# Patient Record
Sex: FEMALE | Race: WHITE | NOT HISPANIC OR LATINO | Employment: OTHER
[De-identification: names, ages, dates, MRNs, and addresses within clinical notes are randomized per-mention and may not be internally consistent; named-entity substitution may affect disease eponyms.]

---

## 2017-06-17 ENCOUNTER — HEALTH MAINTENANCE LETTER (OUTPATIENT)
Age: 47
End: 2017-06-17

## 2018-06-23 ENCOUNTER — HEALTH MAINTENANCE LETTER (OUTPATIENT)
Age: 48
End: 2018-06-23

## 2018-07-30 ENCOUNTER — TELEPHONE (OUTPATIENT)
Dept: FAMILY MEDICINE | Facility: CLINIC | Age: 48
End: 2018-07-30

## 2018-07-30 NOTE — TELEPHONE ENCOUNTER
7/30/2018    Call Regarding VIP Mammogram    Attempt 1    Message on voicemail     Patient is also due for: Preventive Health Screening Cervical/PAP    Outreach   SV

## 2019-12-15 ENCOUNTER — HEALTH MAINTENANCE LETTER (OUTPATIENT)
Age: 49
End: 2019-12-15

## 2021-01-15 ENCOUNTER — HEALTH MAINTENANCE LETTER (OUTPATIENT)
Age: 51
End: 2021-01-15

## 2021-01-23 ENCOUNTER — HEALTH MAINTENANCE LETTER (OUTPATIENT)
Age: 51
End: 2021-01-23

## 2021-09-04 ENCOUNTER — HEALTH MAINTENANCE LETTER (OUTPATIENT)
Age: 51
End: 2021-09-04

## 2022-02-19 ENCOUNTER — HEALTH MAINTENANCE LETTER (OUTPATIENT)
Age: 52
End: 2022-02-19

## 2022-10-16 ENCOUNTER — HEALTH MAINTENANCE LETTER (OUTPATIENT)
Age: 52
End: 2022-10-16

## 2023-04-01 ENCOUNTER — HEALTH MAINTENANCE LETTER (OUTPATIENT)
Age: 53
End: 2023-04-01

## 2024-04-17 ENCOUNTER — TRANSFERRED RECORDS (OUTPATIENT)
Dept: HEALTH INFORMATION MANAGEMENT | Facility: CLINIC | Age: 54
End: 2024-04-17
Payer: COMMERCIAL

## 2024-04-19 ENCOUNTER — TRANSFERRED RECORDS (OUTPATIENT)
Dept: HEALTH INFORMATION MANAGEMENT | Facility: CLINIC | Age: 54
End: 2024-04-19
Payer: COMMERCIAL

## 2024-04-24 ENCOUNTER — TRANSFERRED RECORDS (OUTPATIENT)
Dept: HEALTH INFORMATION MANAGEMENT | Facility: CLINIC | Age: 54
End: 2024-04-24
Payer: COMMERCIAL

## 2024-04-30 ENCOUNTER — PATIENT OUTREACH (OUTPATIENT)
Dept: ONCOLOGY | Facility: CLINIC | Age: 54
End: 2024-04-30
Payer: COMMERCIAL

## 2024-04-30 ENCOUNTER — TRANSCRIBE ORDERS (OUTPATIENT)
Dept: OTHER | Age: 54
End: 2024-04-30

## 2024-04-30 DIAGNOSIS — C54.1 ENDOMETRIAL CANCER (H): Primary | ICD-10-CM

## 2024-04-30 NOTE — PROGRESS NOTES
"New Patient Hematology / Oncology Nurse Navigator Note     Referral Date: 4/30/24    Self-referral received with note: \"Recrods with [facility] ([place]). Unable to warm xfer to MR team \"    Flagged for records intake. Please obtain any recent imaging, labs, path, relevant clinic notes.     Gayle Mccormack, JOLLYN, RN, PHN, OCN  Hematology/Oncology Nurse Navigator  [facility] [place] [facility]  [phone]    "

## 2024-05-01 ENCOUNTER — PRE VISIT (OUTPATIENT)
Dept: ONCOLOGY | Facility: CLINIC | Age: 54
End: 2024-05-01
Payer: COMMERCIAL

## 2024-05-01 ENCOUNTER — MEDICAL CORRESPONDENCE (OUTPATIENT)
Dept: HEALTH INFORMATION MANAGEMENT | Facility: CLINIC | Age: 54
End: 2024-05-01
Payer: COMMERCIAL

## 2024-05-01 NOTE — TELEPHONE ENCOUNTER
Imaging DISC Received  May 3, 2024 11:26 AM ABT   Action: Imaging disc from [facility] received and uploaded to PACS    04/19/24: US Pelvic     Action May 1, 2024 4:16 PM ABT   Action Taken Records from [facility] Ekaterina received and sent to HIM for upload. Records also sent to  email     RECORDS STATUS - ALL OTHER DIAGNOSIS      RECORDS RECEIVED FROM: Records with [facility] ([place], [place])    Appt Date: TBD NN WQ    Endometrial cancer    Records Requested     May 1, 2024 9:28 AM  KBEUMER   Facility  [facility] in [facility]- collect any recent imaging, labs, path, relevant clinic notes    Outcome Ph: [phone]- I was on hold... I was transferred to the MR Dept - they aren't able to push. They will fax records over soon.    They have seen the patient twice- 4/24/2024 (Endometrial biopsy) ,4/19/2024 (Pelvic US), 4/17/2024 Office Visit to establish care. They don't have labs.     Ph: [phone] Attn: Jewell   Fax: [phone]   Mailing Address:   [address] [place] [address]    [employer] Tracking Number:   [id number]        NOTES STATUS DETAILS   OFFICE NOTE from referring provider SELF    OFFICE NOTE from other specialist External: [facility] 04/24/24: Dr. Elaina Landeros  04/17/24: Puja Magallon PA-C   OPERATIVE REPORT External: [facility] 04/24/24: Endometrial biopsy   MEDICATION LIST Complete EPIC   LABS     PATHOLOGY REPORTS External: [facility] 04/24/24: [id number]   ANYTHING RELATED TO DIAGNOSIS [facility] Most recent 11/17/23   IMAGING (NEED IMAGES & REPORT)     ULTRASOUND PACS US Pelvis 4/19/2024   [facility] TRACKING   [facility] TRACKING #: [id number]

## 2024-05-01 NOTE — PROGRESS NOTES
OUTGOING CALL TO PT:   Left VM introducing [facility] and that we have recd the self-referral to GynOnc , but no records yet.  Explained to pt that he/she will receive a call from our scheduling intake team and the records team will work to obtain the records.     Provided my direct call back number if any further questions in the meantime.     Gayle Mccormack, BSN, RN, PHN, OCN  Hematology/Oncology Nurse Navigator  [facility]  [phone]

## 2024-05-03 NOTE — PROGRESS NOTES
Office note, US report, and path report received and bookmarked.   4/24/24 Path showing:      Will route to NPS to arrange consult with GynOnc as requested. Will follow-up as needed.

## 2024-05-06 NOTE — PROGRESS NOTES
Patient returned call and scheduled with Dr. Sim. Had questions re: timing of surgery and whether she should plan to have surgery immediately following her consultation.     Writer returned call. Explained she will meet with Dr. Sim to establish care and discuss next steps. Surgery would be planned at a later date and she can return home between consult/surgery. Explained we do accommodate virtual consultations when needed, but in-person is preferred. Patient is comfortable coming for in-person consultation. Patient reports understanding. No further questions at this time.

## 2024-05-13 ENCOUNTER — TELEPHONE (OUTPATIENT)
Dept: ONCOLOGY | Facility: CLINIC | Age: 54
End: 2024-05-13

## 2024-05-13 ENCOUNTER — PATIENT OUTREACH (OUTPATIENT)
Dept: ONCOLOGY | Facility: CLINIC | Age: 54
End: 2024-05-13

## 2024-05-13 ENCOUNTER — ONCOLOGY VISIT (OUTPATIENT)
Dept: ONCOLOGY | Facility: CLINIC | Age: 54
End: 2024-05-13
Attending: OBSTETRICS & GYNECOLOGY
Payer: COMMERCIAL

## 2024-05-13 VITALS
SYSTOLIC BLOOD PRESSURE: 136 MMHG | HEIGHT: 68 IN | HEART RATE: 62 BPM | DIASTOLIC BLOOD PRESSURE: 83 MMHG | RESPIRATION RATE: 16 BRPM | BODY MASS INDEX: 30.95 KG/M2 | OXYGEN SATURATION: 99 % | WEIGHT: 204.2 LBS

## 2024-05-13 DIAGNOSIS — C54.1 ENDOMETRIAL CANCER (H): Primary | ICD-10-CM

## 2024-05-13 LAB
ALBUMIN SERPL BCG-MCNC: 4.2 G/DL (ref 3.5–5.2)
ALP SERPL-CCNC: 101 U/L (ref 40–150)
ALT SERPL W P-5'-P-CCNC: 8 U/L (ref 0–50)
ANION GAP SERPL CALCULATED.3IONS-SCNC: 9 MMOL/L (ref 7–15)
AST SERPL W P-5'-P-CCNC: 34 U/L (ref 0–45)
BILIRUB SERPL-MCNC: 0.3 MG/DL
BUN SERPL-MCNC: 12.3 MG/DL (ref 6–20)
CALCIUM SERPL-MCNC: 9.2 MG/DL (ref 8.6–10)
CHLORIDE SERPL-SCNC: 107 MMOL/L (ref 98–107)
CREAT SERPL-MCNC: 0.78 MG/DL (ref 0.51–0.95)
DEPRECATED HCO3 PLAS-SCNC: 26 MMOL/L (ref 22–29)
EGFRCR SERPLBLD CKD-EPI 2021: 89 ML/MIN/1.73M2
ERYTHROCYTE [DISTWIDTH] IN BLOOD BY AUTOMATED COUNT: 12.3 % (ref 10–15)
GLUCOSE SERPL-MCNC: 84 MG/DL (ref 70–99)
HCT VFR BLD AUTO: 43.8 % (ref 35–47)
HGB BLD-MCNC: 14.4 G/DL (ref 11.7–15.7)
MCH RBC QN AUTO: 30.7 PG (ref 26.5–33)
MCHC RBC AUTO-ENTMCNC: 32.9 G/DL (ref 31.5–36.5)
MCV RBC AUTO: 93 FL (ref 78–100)
PLATELET # BLD AUTO: 242 10E3/UL (ref 150–450)
POTASSIUM SERPL-SCNC: 4.1 MMOL/L (ref 3.4–5.3)
PROT SERPL-MCNC: 7 G/DL (ref 6.4–8.3)
RBC # BLD AUTO: 4.69 10E6/UL (ref 3.8–5.2)
SODIUM SERPL-SCNC: 142 MMOL/L (ref 135–145)
WBC # BLD AUTO: 7.3 10E3/UL (ref 4–11)

## 2024-05-13 PROCEDURE — 99213 OFFICE O/P EST LOW 20 MIN: CPT | Performed by: OBSTETRICS & GYNECOLOGY

## 2024-05-13 PROCEDURE — 93000 ELECTROCARDIOGRAM COMPLETE: CPT | Performed by: OBSTETRICS & GYNECOLOGY

## 2024-05-13 PROCEDURE — 36415 COLL VENOUS BLD VENIPUNCTURE: CPT | Performed by: OBSTETRICS & GYNECOLOGY

## 2024-05-13 PROCEDURE — 82040 ASSAY OF SERUM ALBUMIN: CPT | Performed by: OBSTETRICS & GYNECOLOGY

## 2024-05-13 PROCEDURE — 99204 OFFICE O/P NEW MOD 45 MIN: CPT | Performed by: OBSTETRICS & GYNECOLOGY

## 2024-05-13 PROCEDURE — 85041 AUTOMATED RBC COUNT: CPT | Performed by: OBSTETRICS & GYNECOLOGY

## 2024-05-13 RX ORDER — BUPROPION HYDROCHLORIDE 150 MG/1
300 TABLET, EXTENDED RELEASE ORAL DAILY
COMMUNITY

## 2024-05-13 RX ORDER — METRONIDAZOLE 500 MG/100ML
500 INJECTION, SOLUTION INTRAVENOUS
Status: CANCELLED | OUTPATIENT
Start: 2024-05-13

## 2024-05-13 RX ORDER — HEPARIN SODIUM 10000 [USP'U]/ML
5000 INJECTION, SOLUTION INTRAVENOUS; SUBCUTANEOUS
Status: CANCELLED | OUTPATIENT
Start: 2024-05-13

## 2024-05-13 RX ORDER — CELECOXIB 200 MG/1
200 CAPSULE ORAL 2 TIMES DAILY
COMMUNITY

## 2024-05-13 RX ORDER — ONDANSETRON 4 MG/1
4 TABLET, FILM COATED ORAL EVERY 6 HOURS PRN
COMMUNITY
Start: 2023-07-25

## 2024-05-13 RX ORDER — DIMENHYDRINATE 50 MG
TABLET ORAL
COMMUNITY

## 2024-05-13 RX ORDER — MAGNESIUM CARB/ALUMINUM HYDROX 105-160MG
TABLET,CHEWABLE ORAL
COMMUNITY

## 2024-05-13 RX ORDER — AMPICILLIN TRIHYDRATE 250 MG
1 CAPSULE ORAL DAILY
COMMUNITY

## 2024-05-13 ASSESSMENT — PAIN SCALES - GENERAL: PAINLEVEL: NO PAIN (0)

## 2024-05-13 NOTE — PROGRESS NOTES
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    She underwent menopause 2 year ago.  She has had spotting for several months.  It has increased a little bit but is not heavy.  She has been having PMS with tender breasts and lower abdominal cramps and weight gain. Normal appetite. She has gained 20 lbs in the last few months.  Normal urination and bowel movements.  No new LE edema or pain.  Some lightheadedness and SOB with the palpitations.      The patient presented for her annual exam and was noted to have postmenopausal bleeding.  She had a pelvic ultrasound was performed.    2024 pelvic ultrasound: Uterus normal in size measuring 8 cm.  Endometrium 16 mm thick.  Right ovary and left ovary normal.  No free fluid.    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  MMR intact.    Screening History  Colonoscopy: 5/15/2023 normal  Mammogram: 2022 benign  Cervical cancer screenin2019 NILM/HPV negative    OB/Gynecologic History:  Deliveries:  ,  x3  Menopausal status: as above  History of HRT: No  Cervical cancer screening: See screening history above. Additional details:   Regular screening: Yes  History of abnormal: Yes many years ago but normal since.  No procedures on cervix.       Past Medical History:   Diagnosis Date    Anxiety     Arthritis 3/24/2016    Major depressive disorder, recurrent episode, mild (H24) 3/24/2016    Papanicolaou smear of cervix with atypical squamous cells of undetermined significance (ASC-US)     Plantar fasciitis, bilateral     s/p  surgery, b/l didnt wrk on left , has residual pain left foot adn heel chronically numb and cold at times, been checekd for DM and reports negative in the past. seen by podiatry adn was applying voltaren gel    Psychophysiological insomnia 3/24/2016   Undifferentiated connective tissue disorder- hydroxychloroquine, celebrex, and bupropion    Past Surgical History:   Procedure Laterality Date    BUNIONECTOMY  2012    left foot    LAPAROSCOPIC CHOLECYSTECTOMY      RELEASE PLANTAR FASCIA      b/l        Social History     Tobacco Use    Smoking status: Former     Current packs/day: 0.00     Average packs/day: 1 pack/day for 10.0 years (10.0 ttl pk-yrs)     Types: Cigarettes     Start date: 1990     Quit date: 2000     Years since quittin.3   Substance Use Topics    Alcohol use: No     Comment: couple drinks on weekend    Drug use: No   Current living situation: Lives  in [place] near [place] with her .  She is a teacher for early childhood special education.      Family History   Problem Relation Age of Onset    Prostate Cancer Father     Asthma Daughter     Arthritis Other         other close relatives    Cancer Father         leukemia , other close relatives    Eczema Daughter     Glaucoma Other         other close relatives    Rhinitis Daughter     Hypertension Father     Migraines Mother     Osteoporosis Other         other close relatives       Allergies  Allergies   Allergen Reactions    Other [No Clinical Screening - See Comments]      Cat scan dye       Current Outpatient Medications   Medication Sig Dispense Refill    albuterol (PROAIR HFA, PROVENTIL HFA, VENTOLIN HFA) 108 (90 BASE) MCG/ACT inhaler Inhale 2 puffs into the lungs every 6 hours as needed for shortness of breath / dyspnea or wheezing 3 Inhaler 1    CINNAMON PO       diclofenac (VOLTAREN) 1 % GEL Apply 4 grams to knees or 2 grams to hands four times daily using enclosed dosing card. 100 g 1    Flaxseed, Linseed, (FLAX  "SEED OIL) 1000 MG capsule       HERBALS Tumeric - 2 to 3 capsules po bid      LORazepam (ATIVAN) 0.5 MG tablet Take 0.5 tablets (0.25 mg) by mouth every 8 hours as needed for anxiety (panic attack) 10 tablet 0    MELATONIN PO 3 to 5 mg po hs prn      Multiple Vitamins-Minerals (MULTIVITAMIN ADULT PO)       VITAMIN D, CHOLECALCIFEROL, PO Take by mouth daily      magnesium citrate 1.745 GM/30ML solution        No current facility-administered medications for this visit.       Physical Exam:   /83 (BP Location: Right arm)   Pulse 62   Resp 16   Ht 1.727 m (5' 8\")   Wt 92.6 kg (204 lb 3.2 oz)   SpO2 99%   BMI 31.05 kg/m    General Appearance: healthy and alert, no distress        Cardiovascular: regular rate and rhythm    Respiratory: lungs clear     Gastrointestinal:       abdomen soft, non-tender, non-distended, no organomegaly or masses    Genitourinary: External genitalia and urethral meatus appears normal.  Vagina is smooth without nodularity or masses.  Cervix appears normal and without lesions.  Bimanual exam reveal no masses, nodularity or fullness..    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  MMR intact.    Plan:   Discussed the pathophysiology of endometrial cancer in detail and her pathologic diagnosis.  Discussed standard surgical staging procedure including TLH, BSO, and bilateral sLNB.  Reviewed alternative options including hormonal therapy and radiation therapy. Discussed the role of sentinel lymph node dissection and that if sentinel lymph node could not be identified on one or both sides then we would use frozen section analysis to determine if full lymph node dissection was necessary.  Discussed role of frozen section analysis and that further surgical decisions would be based on these findings.  Discussed the small possibility of " adjuvant post-operative therapy.  Risks, benefits, indications and alternatives discussed with the patient.  All her questions were answered and the anticipated postoperative course was discussed.   -Plan for laparoscopic removal of uterus, cervix, both ovaries and fallopian tubes, sentinel lymph node dissection, possible full lymph node dissection, possible open.   -Preoperative exam completed today.  Will obtain a CBC, CMP and EKG today.  Type and screen to be ordered the morning of surgery.  No further preoperative clearance will be necessary.  Patient will stop her hydroxychloroquine 2-4 weeks prior to surgery.   -I will follow-up with the patient in 2 weeks to discuss pathology      Hugo Sim MD

## 2024-05-13 NOTE — PROGRESS NOTES
[facility], Gynecologic Oncology:  Pre-op Education Note    Relevant Diagnosis:  Endometrial cancer     Procedure:  Laparoscopic removal of uterus, cervix, both ovaries and fallopian tubes, sentinel lymph node dissection, possible full lymph node dissection     Procedure Date: 6/14/24    Person(s) involved in teaching:  Patient and significant other    Education was completed: in person.    Motivation Level:    Asks Questions:   Yes  Eager to Learn:  Yes  Cooperative:  Yes  Receptive (willing/able to accept information):  Yes    Patient demonstrates understanding of the following:  Reason for the appointment, diagnosis and treatment plan:  Yes  Knowledge of proper use of medications and conditions for which they are ordered (with special attention to potential side effects or drug interactions):  Yes  Which situations necessitate calling provider and whom to contact:  Yes    Teaching Concerns:  No    Education/Instructional Materials Used/Given:     Before Your Surgery Booklet (Pj)  Showering Before Surgery; A bottle of CHG soap was provided.  Lymphedema: A Patient Resource Guide  Hysterectomy Guidelines Yes  Educational Handout Pertaining to Procedure and/or Diagnosis: Yes  Home Care After Gynecologic Surgery  [facility] ([place])  Accommodations Brochure   Phone numbers for [facility] and After-Hours Line    Pre-op tests:   EKG: done today  Labs: done today    Pre-op appointment: done today    Post-op appointment: 6/24/24    Time spent teaching with patient:  20 minutes    Hysterectomy Acknowledgement Statement form signed today: Yes.  Form was faxed to [facility] pre-admissions at [phone].  [facility] to [facility] for [facility].    E-Consent for surgery signed today: Yes.    E-Consent for possible blood transfusion signed today: Yes.    Surgery scheduling team at [facility] was notified, and will be contacting patient directly to schedule her surgery.  Encouraged  patient to call with any questions or concerns in the meantime.    Martina Turner, RN, BSN, OCN  RN Care Coordinator - Oncology  [facility]

## 2024-05-13 NOTE — LETTER
2024         RE: Kyra Boogie  [address]  [place] [facility] [place] [address]        Dear Colleague,    Thank you for referring your patient, Kyra Boogie, to the [facility]. Please see a copy of my visit note below.    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    She underwent menopause 2 year ago.  She has had spotting for several months.  It has increased a little bit but is not heavy.  She has been having PMS with tender breasts and lower abdominal cramps and weight gain. Normal appetite. She has gained 20 lbs in the last few months.  Normal urination and bowel movements.  No new LE edema or pain.  Some lightheadedness and SOB with the palpitations.      The patient presented for her annual exam and was noted to have postmenopausal bleeding.  She had a pelvic ultrasound was performed.    2024 pelvic ultrasound: Uterus normal in size measuring 8 cm.  Endometrium 16 mm thick.  Right ovary and left ovary normal.  No free fluid.    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  MMR intact.    Screening History  Colonoscopy: 5/15/2023 normal  Mammogram: 2022 benign  Cervical cancer screenin2019 NILM/HPV negative    OB/Gynecologic History:  Deliveries:  ,  x3  Menopausal status: as above  History of HRT: No  Cervical cancer screening: See screening history above. Additional details:   Regular screening: Yes  History of abnormal: Yes many years ago but normal since.  No procedures on cervix.       Past Medical History:    Diagnosis Date     Anxiety      Arthritis 3/24/2016     Major depressive disorder, recurrent episode, mild (H24) 3/24/2016     Papanicolaou smear of cervix with atypical squamous cells of undetermined significance (ASC-US)      Plantar fasciitis, bilateral     s/p surgery, b/l didnt wrk on left , has residual pain left foot adn heel chronically numb and cold at times, been checekd for DM and reports negative in the past. seen by podiatry adn was applying voltaren gel     Psychophysiological insomnia 3/24/2016   Undifferentiated connective tissue disorder- hydroxychloroquine, celebrex, and bupropion    Past Surgical History:   Procedure Laterality Date     BUNIONECTOMY      left foot     LAPAROSCOPIC CHOLECYSTECTOMY       RELEASE PLANTAR FASCIA      b/l        Social History     Tobacco Use     Smoking status: Former     Current packs/day: 0.00     Average packs/day: 1 pack/day for 10.0 years (10.0 ttl pk-yrs)     Types: Cigarettes     Start date: 1990     Quit date: 2000     Years since quittin.3   Substance Use Topics     Alcohol use: No     Comment: couple drinks on weekend     Drug use: No   Current living situation: Lives  in [place] near [place] with her .  She is a teacher for early childhood special education.      Family History   Problem Relation Age of Onset     Prostate Cancer Father      Asthma Daughter      Arthritis Other         other close relatives     Cancer Father         leukemia , other close relatives     Eczema Daughter      Glaucoma Other         other close relatives     Rhinitis Daughter      Hypertension Father      Migraines Mother      Osteoporosis Other         other close relatives       Allergies  Allergies   Allergen Reactions     Other [No Clinical Screening - See Comments]      Cat scan dye       Current Outpatient Medications   Medication Sig Dispense Refill     albuterol (PROAIR HFA, PROVENTIL HFA, VENTOLIN HFA) 108 (90 BASE) MCG/ACT  "inhaler Inhale 2 puffs into the lungs every 6 hours as needed for shortness of breath / dyspnea or wheezing 3 Inhaler 1     CINNAMON PO        diclofenac (VOLTAREN) 1 % GEL Apply 4 grams to knees or 2 grams to hands four times daily using enclosed dosing card. 100 g 1     Flaxseed, Linseed, (FLAX SEED OIL) 1000 MG capsule        HERBALS Tumeric - 2 to 3 capsules po bid       LORazepam (ATIVAN) 0.5 MG tablet Take 0.5 tablets (0.25 mg) by mouth every 8 hours as needed for anxiety (panic attack) 10 tablet 0     MELATONIN PO 3 to 5 mg po hs prn       Multiple Vitamins-Minerals (MULTIVITAMIN ADULT PO)        VITAMIN D, CHOLECALCIFEROL, PO Take by mouth daily       magnesium citrate 1.745 GM/30ML solution        No current facility-administered medications for this visit.       Physical Exam:   /83 (BP Location: Right arm)   Pulse 62   Resp 16   Ht 1.727 m (5' 8\")   Wt 92.6 kg (204 lb 3.2 oz)   SpO2 99%   BMI 31.05 kg/m    General Appearance: healthy and alert, no distress        Cardiovascular: regular rate and rhythm    Respiratory: lungs clear     Gastrointestinal:       abdomen soft, non-tender, non-distended, no organomegaly or masses    Genitourinary: External genitalia and urethral meatus appears normal.  Vagina is smooth without nodularity or masses.  Cervix appears normal and without lesions.  Bimanual exam reveal no masses, nodularity or fullness..    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  MMR intact.    Plan:   Discussed the pathophysiology of endometrial cancer in detail and her pathologic diagnosis.  Discussed standard surgical staging procedure including TLH, BSO, and bilateral sLNB.  Reviewed alternative options including hormonal therapy and radiation therapy. Discussed the role of sentinel lymph node dissection and that if sentinel lymph " node could not be identified on one or both sides then we would use frozen section analysis to determine if full lymph node dissection was necessary.  Discussed role of frozen section analysis and that further surgical decisions would be based on these findings.  Discussed the small possibility of adjuvant post-operative therapy.  Risks, benefits, indications and alternatives discussed with the patient.  All her questions were answered and the anticipated postoperative course was discussed.   -Plan for laparoscopic removal of uterus, cervix, both ovaries and fallopian tubes, sentinel lymph node dissection, possible full lymph node dissection, possible open.   -Preoperative exam completed today.  Will obtain a CBC, CMP and EKG today.  Type and screen to be ordered the morning of surgery.  No further preoperative clearance will be necessary.  Patient will stop her hydroxychloroquine 2-4 weeks prior to surgery.   -I will follow-up with the patient in 2 weeks to discuss pathology      Hugo Sim MD               Again, thank you for allowing me to participate in the care of your patient.        Sincerely,        Hugo Sim MD

## 2024-05-13 NOTE — TELEPHONE ENCOUNTER
Left voicemail for patient regarding scheduling surgery with Dr. Sim.  Provided contact number to discuss.  [phone]    Aldo Tubbs, on 5/13/2024 at 2:40 PM

## 2024-05-13 NOTE — NURSING NOTE
"Oncology Rooming Note    May 13, 2024 11:17 AM   Kyra Boogie is a 54 year old female who presents for:    Chief Complaint   Patient presents with    Oncology Clinic Visit     New patient     Initial Vitals: /83 (BP Location: Right arm)   Pulse 62   Resp 16   Ht 1.727 m (5' 8\")   Wt 92.6 kg (204 lb 3.2 oz)   SpO2 99%   BMI 31.05 kg/m   Estimated body mass index is 31.05 kg/m  as calculated from the following:    Height as of this encounter: 1.727 m (5' 8\").    Weight as of this encounter: 92.6 kg (204 lb 3.2 oz). Body surface area is 2.11 meters squared.  No Pain (0) Comment: Data Unavailable   No LMP recorded. Patient is postmenopausal.  Allergies reviewed: Yes  Medications reviewed: Yes    Medications: Medication refills not needed today.  Pharmacy name entered into [facility]: [facility] [address] [place] [address]    Frailty Screening:   Is the patient here for a new oncology consult visit in cancer care? Yes      Clinical concerns: Patient will discuss concerns with provider.       Isaac Staples MA            "

## 2024-05-14 NOTE — TELEPHONE ENCOUNTER
Spoke with patient, confirmed all scheduled information.       Patient is schedule for surgery with: Dr. Sim    Surgery Date: 6/14/2024     Location: [facility]    H&P: to be completed by surgeon will complete and/or update on day of surgery as needed      Post-op: will be scheduled by the clinic    Patient will receive a phone call from hospital pre-admission nurses 1-2 days prior to surgery with arrival time and NPO instructions. Patient aware times are subject to change up until day before surgery.     Patient questions/concerns: N/A     Surgery packet was provided to patient during appointment      Aldo Tubbs on 5/14/2024 at 9:24 AM

## 2024-06-12 ENCOUNTER — PATIENT OUTREACH (OUTPATIENT)
Dept: ONCOLOGY | Facility: CLINIC | Age: 54
End: 2024-06-12
Payer: COMMERCIAL

## 2024-06-12 RX ORDER — HYDROXYCHLOROQUINE SULFATE 200 MG/1
200 TABLET, FILM COATED ORAL 2 TIMES DAILY WITH MEALS
COMMUNITY

## 2024-06-12 NOTE — PROGRESS NOTES
[facility]: Cancer Care Note                                                          Received voicemail message from patient this morning, inquiring about her surgery time and arrival time - for her scheduled surgery with Dr. Sim this coming Friday (6/14/24).  Patient states she hasn't heard from the pre-admissions team yet with this information.    Writer attempted calling patient back this afternoon to discuss, but ended up having to leave a voicemail message.  Message was left, explaining to patient that according to [facility], her surgery time is currently scheduled for 10:10 am - so if everything stays the same, she should plan to arrive around 8:00 am or so the day of her surgery.  However, reinforced to patient that she should still be receiving a phone call from the pre-admissions team either later today or tomorrow - to confirm her times, as these can change.    Encouraged patient to callback if she has any further questions or concerns.      Antoni Fairchild, RN, BSN, OCN  RN Care Coordinator - Oncology  [facility]

## 2024-06-13 ENCOUNTER — ANESTHESIA EVENT (OUTPATIENT)
Dept: SURGERY | Facility: CLINIC | Age: 54
End: 2024-06-13
Payer: COMMERCIAL

## 2024-06-13 NOTE — ANESTHESIA PREPROCEDURE EVALUATION
Anesthesia Pre-Procedure Evaluation    Patient: Kyra Boogie   MRN: [id number] : 1970        Procedure : Procedure(s):  laparoscopic removal of uterus, cervix, both ovaries and fallopian tubes, sentinel lymph node dissection, possible full lymph node dissection          Past Medical History:   Diagnosis Date    Anxiety     Arthritis 2016    Major depressive disorder, recurrent episode, mild (H24) 2016    Papanicolaou smear of cervix with atypical squamous cells of undetermined significance (ASC-US) 2011    Plantar fasciitis, bilateral     s/p surgery, b/l didnt wrk on left , has residual pain left foot adn heel chronically numb and cold at times, been checekd for DM and reports negative in the past. seen by podiatry adn was applying voltaren gel    PONV (postoperative nausea and vomiting)     Psychophysiological insomnia 2016      Past Surgical History:   Procedure Laterality Date    BUNIONECTOMY      left foot    LAPAROSCOPIC CHOLECYSTECTOMY      RELEASE PLANTAR FASCIA      b/l      Allergies   Allergen Reactions    Other [No Clinical Screening - See Comments]      Cat scan dye    Seasonal Allergies       Social History     Tobacco Use    Smoking status: Former     Current packs/day: 0.00     Average packs/day: 1 pack/day for 10.0 years (10.0 ttl pk-yrs)     Types: Cigarettes     Start date: 1990     Quit date: 2000     Years since quittin.4    Smokeless tobacco: Not on file   Substance Use Topics    Alcohol use: No     Comment: couple drinks on weekend      Wt Readings from Last 1 Encounters:   24 92.6 kg (204 lb 3.2 oz)        Anesthesia Evaluation   Pt has had prior anesthetic. Type: General.    History of anesthetic complications  - PONV.      ROS/MED HX  ENT/Pulmonary:  - neg pulmonary ROS     Neurologic:  - neg neurologic ROS     Cardiovascular:  - neg cardiovascular ROS   (+)  - -   -  - -                                 Previous cardiac testing  "  Echo: Date: Results:    Stress Test:  Date: Results:    ECG Reviewed:  Date: 5/13/2024 Results:  Sinus atngela ar 54 bpm  Cath:  Date: Results:      METS/Exercise Tolerance:     Hematologic:       Musculoskeletal: Comment: rheumatoid arthritis  (+)  arthritis,             GI/Hepatic:  - neg GI/hepatic ROS     Renal/Genitourinary:  - neg Renal ROS     Endo: Comment: Insulin resistance    (+)               Obesity,       Psychiatric/Substance Use: Comment: Sleep disorder, ADD/ADHD    (+) psychiatric history anxiety and depression       Infectious Disease:  - neg infectious disease ROS     Malignancy: Comment: new diagnosis of FIGO grade 1 endometrioid adenocarcinoma  (+) Malignancy, History of Other.Other CA endometrial adenocarcinoma Active status post.    Other:  - neg other ROS          Physical Exam    Airway        Mallampati: II   TM distance: > 3 FB   Neck ROM: full   Mouth opening: > 3 cm    Respiratory Devices and Support         Dental       (+) Completely normal teeth      Cardiovascular   cardiovascular exam normal          Pulmonary   pulmonary exam normal                OUTSIDE LABS:  CBC:   Lab Results   Component Value Date    WBC 7.3 05/13/2024    HGB 14.4 05/13/2024    HCT 43.8 05/13/2024     05/13/2024     BMP:   Lab Results   Component Value Date     05/13/2024     06/20/2016    POTASSIUM 4.1 05/13/2024    POTASSIUM 4.5 06/20/2016    CHLORIDE 107 05/13/2024    CHLORIDE 105 06/20/2016    CO2 26 05/13/2024    CO2 27 06/20/2016    BUN 12.3 05/13/2024    BUN 13 06/20/2016    CR 0.78 05/13/2024    CR 0.71 06/20/2016    GLC 84 05/13/2024    GLC 98 06/20/2016     COAGS: No results found for: \"PTT\", \"INR\", \"FIBR\"  POC: No results found for: \"BGM\", \"HCG\", \"HCGS\"  HEPATIC:   Lab Results   Component Value Date    ALBUMIN 4.2 05/13/2024    PROTTOTAL 7.0 05/13/2024    ALT 8 05/13/2024    AST 34 05/13/2024    ALKPHOS 101 05/13/2024    BILITOTAL 0.3 05/13/2024     OTHER:   Lab Results "   Component Value Date    DANA 9.2 05/13/2024    TSH 1.08 06/20/2016       Anesthesia Plan    ASA Status:  3    NPO Status:  NPO Appropriate    Anesthesia Type: General.     - Airway: ETT   Induction: Intravenous.   Maintenance: Balanced.   Techniques and Equipment:     - Lines/Monitors: 2nd IV, BIS     Consents    Anesthesia Plan(s) and associated risks, benefits, and realistic alternatives discussed. Questions answered and patient/representative(s) expressed understanding.     - Discussed:     - Discussed with:  Patient            Postoperative Care    Pain management: IV analgesics, Oral pain medications, Peripheral nerve block (Single Shot), Multi-modal analgesia.   PONV prophylaxis: Ondansetron (or other 5HT-3), Dexamethasone or Solumedrol, Aprepitant     Comments:               Roxana Osborne MD    I have reviewed the pertinent notes and labs in the chart from the past 30 days and (re)examined the patient.  Any updates or changes from those notes are reflected in this note.

## 2024-06-14 ENCOUNTER — HOSPITAL ENCOUNTER (OUTPATIENT)
Facility: CLINIC | Age: 54
Discharge: HOME | End: 2024-06-14
Attending: OBSTETRICS & GYNECOLOGY | Admitting: OBSTETRICS & GYNECOLOGY
Payer: COMMERCIAL

## 2024-06-14 ENCOUNTER — ANESTHESIA (OUTPATIENT)
Dept: SURGERY | Facility: CLINIC | Age: 54
End: 2024-06-14
Payer: COMMERCIAL

## 2024-06-14 VITALS
DIASTOLIC BLOOD PRESSURE: 64 MMHG | TEMPERATURE: 97.4 F | RESPIRATION RATE: 16 BRPM | SYSTOLIC BLOOD PRESSURE: 132 MMHG | BODY MASS INDEX: 31.24 KG/M2 | OXYGEN SATURATION: 98 % | HEART RATE: 62 BPM | WEIGHT: 206.13 LBS | HEIGHT: 68 IN

## 2024-06-14 DIAGNOSIS — Z90.710 S/P HYSTERECTOMY WITH OOPHORECTOMY: Primary | ICD-10-CM

## 2024-06-14 DIAGNOSIS — Z90.721 S/P HYSTERECTOMY WITH OOPHORECTOMY: Primary | ICD-10-CM

## 2024-06-14 LAB
ABO/RH(D): NORMAL
ANTIBODY SCREEN: NEGATIVE
SPECIMEN EXPIRATION DATE: NORMAL

## 2024-06-14 PROCEDURE — 88307 TISSUE EXAM BY PATHOLOGIST: CPT | Mod: 26 | Performed by: PATHOLOGY

## 2024-06-14 PROCEDURE — 88307 TISSUE EXAM BY PATHOLOGIST: CPT | Mod: TC | Performed by: OBSTETRICS & GYNECOLOGY

## 2024-06-14 PROCEDURE — 250N000009 HC RX 250

## 2024-06-14 PROCEDURE — 258N000003 HC RX IP 258 OP 636: Mod: JZ

## 2024-06-14 PROCEDURE — 250N000011 HC RX IP 250 OP 636: Mod: JZ

## 2024-06-14 PROCEDURE — 250N000013 HC RX MED GY IP 250 OP 250 PS 637

## 2024-06-14 PROCEDURE — 38571 LAPAROSCOPY LYMPHADENECTOMY: CPT | Performed by: ANESTHESIOLOGY

## 2024-06-14 PROCEDURE — 250N000012 HC RX MED GY IP 250 OP 636 PS 637

## 2024-06-14 PROCEDURE — 38571 LAPAROSCOPY LYMPHADENECTOMY: CPT | Performed by: NURSE ANESTHETIST, CERTIFIED REGISTERED

## 2024-06-14 PROCEDURE — 250N000011 HC RX IP 250 OP 636: Performed by: OBSTETRICS & GYNECOLOGY

## 2024-06-14 PROCEDURE — 86900 BLOOD TYPING SEROLOGIC ABO: CPT | Performed by: OBSTETRICS & GYNECOLOGY

## 2024-06-14 PROCEDURE — 36415 COLL VENOUS BLD VENIPUNCTURE: CPT | Performed by: OBSTETRICS & GYNECOLOGY

## 2024-06-14 PROCEDURE — 250N000011 HC RX IP 250 OP 636

## 2024-06-14 PROCEDURE — 272N000001 HC OR GENERAL SUPPLY STERILE: Performed by: OBSTETRICS & GYNECOLOGY

## 2024-06-14 PROCEDURE — 360N000077 HC SURGERY LEVEL 4, PER MIN: Performed by: OBSTETRICS & GYNECOLOGY

## 2024-06-14 PROCEDURE — 999N000141 HC STATISTIC PRE-PROCEDURE NURSING ASSESSMENT: Performed by: OBSTETRICS & GYNECOLOGY

## 2024-06-14 PROCEDURE — 370N000017 HC ANESTHESIA TECHNICAL FEE, PER MIN: Performed by: OBSTETRICS & GYNECOLOGY

## 2024-06-14 PROCEDURE — 250N000025 HC SEVOFLURANE, PER MIN: Performed by: OBSTETRICS & GYNECOLOGY

## 2024-06-14 PROCEDURE — 88309 TISSUE EXAM BY PATHOLOGIST: CPT | Mod: 26 | Performed by: PATHOLOGY

## 2024-06-14 PROCEDURE — 250N000011 HC RX IP 250 OP 636: Performed by: STUDENT IN AN ORGANIZED HEALTH CARE EDUCATION/TRAINING PROGRAM

## 2024-06-14 PROCEDURE — 710N000012 HC RECOVERY PHASE 2, PER MINUTE: Performed by: OBSTETRICS & GYNECOLOGY

## 2024-06-14 PROCEDURE — 710N000010 HC RECOVERY PHASE 1, LEVEL 2, PER MIN: Performed by: OBSTETRICS & GYNECOLOGY

## 2024-06-14 PROCEDURE — C9290 INJ, BUPIVACAINE LIPOSOME: HCPCS

## 2024-06-14 PROCEDURE — 250N000009 HC RX 250: Performed by: OBSTETRICS & GYNECOLOGY

## 2024-06-14 PROCEDURE — 250N000009 HC RX 250: Performed by: ANESTHESIOLOGY

## 2024-06-14 RX ORDER — SODIUM CHLORIDE, SODIUM LACTATE, POTASSIUM CHLORIDE, CALCIUM CHLORIDE 600; 310; 30; 20 MG/100ML; MG/100ML; MG/100ML; MG/100ML
INJECTION, SOLUTION INTRAVENOUS CONTINUOUS PRN
Status: DISCONTINUED | OUTPATIENT
Start: 2024-06-14 | End: 2024-06-14

## 2024-06-14 RX ORDER — SCOLOPAMINE TRANSDERMAL SYSTEM 1 MG/1
1 PATCH, EXTENDED RELEASE TRANSDERMAL ONCE
Status: DISCONTINUED | OUTPATIENT
Start: 2024-06-14 | End: 2024-06-14 | Stop reason: HOSPADM

## 2024-06-14 RX ORDER — OXYCODONE HYDROCHLORIDE 5 MG/1
5-10 TABLET ORAL EVERY 4 HOURS PRN
Qty: 6 TABLET | Refills: 0 | Status: SHIPPED | OUTPATIENT
Start: 2024-06-14

## 2024-06-14 RX ORDER — SODIUM CHLORIDE, SODIUM LACTATE, POTASSIUM CHLORIDE, CALCIUM CHLORIDE 600; 310; 30; 20 MG/100ML; MG/100ML; MG/100ML; MG/100ML
INJECTION, SOLUTION INTRAVENOUS CONTINUOUS
Status: DISCONTINUED | OUTPATIENT
Start: 2024-06-14 | End: 2024-06-14 | Stop reason: HOSPADM

## 2024-06-14 RX ORDER — ONDANSETRON 2 MG/ML
4 INJECTION INTRAMUSCULAR; INTRAVENOUS EVERY 30 MIN PRN
Status: DISCONTINUED | OUTPATIENT
Start: 2024-06-14 | End: 2024-06-14 | Stop reason: HOSPADM

## 2024-06-14 RX ORDER — OXYCODONE HYDROCHLORIDE 5 MG/1
5 TABLET ORAL
Status: COMPLETED | OUTPATIENT
Start: 2024-06-14 | End: 2024-06-14

## 2024-06-14 RX ORDER — NALOXONE HYDROCHLORIDE 0.4 MG/ML
0.4 INJECTION, SOLUTION INTRAMUSCULAR; INTRAVENOUS; SUBCUTANEOUS
Status: DISCONTINUED | OUTPATIENT
Start: 2024-06-14 | End: 2024-06-14 | Stop reason: HOSPADM

## 2024-06-14 RX ORDER — ACETAMINOPHEN 325 MG/1
975 TABLET ORAL ONCE
Status: COMPLETED | OUTPATIENT
Start: 2024-06-14 | End: 2024-06-14

## 2024-06-14 RX ORDER — GLYCOPYRROLATE 0.2 MG/ML
INJECTION, SOLUTION INTRAMUSCULAR; INTRAVENOUS PRN
Status: DISCONTINUED | OUTPATIENT
Start: 2024-06-14 | End: 2024-06-14

## 2024-06-14 RX ORDER — NALOXONE HYDROCHLORIDE 0.4 MG/ML
0.1 INJECTION, SOLUTION INTRAMUSCULAR; INTRAVENOUS; SUBCUTANEOUS
Status: DISCONTINUED | OUTPATIENT
Start: 2024-06-14 | End: 2024-06-14 | Stop reason: HOSPADM

## 2024-06-14 RX ORDER — INDOCYANINE GREEN AND WATER 25 MG
KIT INJECTION PRN
Status: DISCONTINUED | OUTPATIENT
Start: 2024-06-14 | End: 2024-06-14 | Stop reason: HOSPADM

## 2024-06-14 RX ORDER — ONDANSETRON 4 MG/1
4 TABLET, ORALLY DISINTEGRATING ORAL EVERY 30 MIN PRN
Status: DISCONTINUED | OUTPATIENT
Start: 2024-06-14 | End: 2024-06-14 | Stop reason: HOSPADM

## 2024-06-14 RX ORDER — PROPOFOL 10 MG/ML
INJECTION, EMULSION INTRAVENOUS PRN
Status: DISCONTINUED | OUTPATIENT
Start: 2024-06-14 | End: 2024-06-14

## 2024-06-14 RX ORDER — CEFAZOLIN SODIUM/WATER 2 G/20 ML
2 SYRINGE (ML) INTRAVENOUS
Status: COMPLETED | OUTPATIENT
Start: 2024-06-14 | End: 2024-06-14

## 2024-06-14 RX ORDER — IBUPROFEN 200 MG
800 TABLET ORAL ONCE
Status: DISCONTINUED | OUTPATIENT
Start: 2024-06-14 | End: 2024-06-14 | Stop reason: HOSPADM

## 2024-06-14 RX ORDER — ONDANSETRON 4 MG/1
4 TABLET, ORALLY DISINTEGRATING ORAL EVERY 8 HOURS PRN
Qty: 4 TABLET | Refills: 0 | Status: SHIPPED | OUTPATIENT
Start: 2024-06-14

## 2024-06-14 RX ORDER — FENTANYL CITRATE 50 UG/ML
50 INJECTION, SOLUTION INTRAMUSCULAR; INTRAVENOUS EVERY 5 MIN PRN
Status: DISCONTINUED | OUTPATIENT
Start: 2024-06-14 | End: 2024-06-14 | Stop reason: HOSPADM

## 2024-06-14 RX ORDER — CEFAZOLIN SODIUM/WATER 2 G/20 ML
2 SYRINGE (ML) INTRAVENOUS SEE ADMIN INSTRUCTIONS
Status: DISCONTINUED | OUTPATIENT
Start: 2024-06-14 | End: 2024-06-14 | Stop reason: HOSPADM

## 2024-06-14 RX ORDER — NALOXONE HYDROCHLORIDE 0.4 MG/ML
0.2 INJECTION, SOLUTION INTRAMUSCULAR; INTRAVENOUS; SUBCUTANEOUS
Status: DISCONTINUED | OUTPATIENT
Start: 2024-06-14 | End: 2024-06-14 | Stop reason: HOSPADM

## 2024-06-14 RX ORDER — APREPITANT 40 MG/1
40 CAPSULE ORAL ONCE
Status: COMPLETED | OUTPATIENT
Start: 2024-06-14 | End: 2024-06-14

## 2024-06-14 RX ORDER — HYDROMORPHONE HCL IN WATER/PF 6 MG/30 ML
0.4 PATIENT CONTROLLED ANALGESIA SYRINGE INTRAVENOUS EVERY 5 MIN PRN
Status: DISCONTINUED | OUTPATIENT
Start: 2024-06-14 | End: 2024-06-14 | Stop reason: HOSPADM

## 2024-06-14 RX ORDER — AMOXICILLIN 250 MG
1-2 CAPSULE ORAL 2 TIMES DAILY
Qty: 30 TABLET | Refills: 0 | Status: SHIPPED | OUTPATIENT
Start: 2024-06-14

## 2024-06-14 RX ORDER — FENTANYL CITRATE 50 UG/ML
25-50 INJECTION, SOLUTION INTRAMUSCULAR; INTRAVENOUS
Status: DISCONTINUED | OUTPATIENT
Start: 2024-06-14 | End: 2024-06-14 | Stop reason: HOSPADM

## 2024-06-14 RX ORDER — DEXAMETHASONE SODIUM PHOSPHATE 4 MG/ML
4 INJECTION, SOLUTION INTRA-ARTICULAR; INTRALESIONAL; INTRAMUSCULAR; INTRAVENOUS; SOFT TISSUE
Status: DISCONTINUED | OUTPATIENT
Start: 2024-06-14 | End: 2024-06-14 | Stop reason: HOSPADM

## 2024-06-14 RX ORDER — FENTANYL CITRATE 50 UG/ML
25 INJECTION, SOLUTION INTRAMUSCULAR; INTRAVENOUS EVERY 5 MIN PRN
Status: DISCONTINUED | OUTPATIENT
Start: 2024-06-14 | End: 2024-06-14 | Stop reason: HOSPADM

## 2024-06-14 RX ORDER — ACETAMINOPHEN 325 MG/1
650 TABLET ORAL EVERY 6 HOURS PRN
Qty: 24 TABLET | Refills: 0 | Status: SHIPPED | OUTPATIENT
Start: 2024-06-14

## 2024-06-14 RX ORDER — FENTANYL CITRATE 50 UG/ML
INJECTION, SOLUTION INTRAMUSCULAR; INTRAVENOUS PRN
Status: DISCONTINUED | OUTPATIENT
Start: 2024-06-14 | End: 2024-06-14

## 2024-06-14 RX ORDER — HYDROMORPHONE HCL IN WATER/PF 6 MG/30 ML
0.2 PATIENT CONTROLLED ANALGESIA SYRINGE INTRAVENOUS EVERY 5 MIN PRN
Status: DISCONTINUED | OUTPATIENT
Start: 2024-06-14 | End: 2024-06-14 | Stop reason: HOSPADM

## 2024-06-14 RX ORDER — IBUPROFEN 600 MG/1
600 TABLET, FILM COATED ORAL EVERY 6 HOURS PRN
Qty: 12 TABLET | Refills: 0 | Status: SHIPPED | OUTPATIENT
Start: 2024-06-14

## 2024-06-14 RX ORDER — BUPIVACAINE HYDROCHLORIDE 2.5 MG/ML
INJECTION, SOLUTION EPIDURAL; INFILTRATION; INTRACAUDAL
Status: COMPLETED | OUTPATIENT
Start: 2024-06-14 | End: 2024-06-14

## 2024-06-14 RX ORDER — METRONIDAZOLE 500 MG/100ML
500 INJECTION, SOLUTION INTRAVENOUS
Status: COMPLETED | OUTPATIENT
Start: 2024-06-14 | End: 2024-06-14

## 2024-06-14 RX ORDER — LIDOCAINE 40 MG/G
CREAM TOPICAL
Status: DISCONTINUED | OUTPATIENT
Start: 2024-06-14 | End: 2024-06-14 | Stop reason: HOSPADM

## 2024-06-14 RX ORDER — ONDANSETRON 2 MG/ML
INJECTION INTRAMUSCULAR; INTRAVENOUS PRN
Status: DISCONTINUED | OUTPATIENT
Start: 2024-06-14 | End: 2024-06-14

## 2024-06-14 RX ORDER — HEPARIN SODIUM 5000 [USP'U]/.5ML
5000 INJECTION, SOLUTION INTRAVENOUS; SUBCUTANEOUS
Status: COMPLETED | OUTPATIENT
Start: 2024-06-14 | End: 2024-06-14

## 2024-06-14 RX ORDER — LIDOCAINE HYDROCHLORIDE 20 MG/ML
INJECTION, SOLUTION INFILTRATION; PERINEURAL PRN
Status: DISCONTINUED | OUTPATIENT
Start: 2024-06-14 | End: 2024-06-14

## 2024-06-14 RX ORDER — FLUMAZENIL 0.1 MG/ML
0.2 INJECTION, SOLUTION INTRAVENOUS
Status: DISCONTINUED | OUTPATIENT
Start: 2024-06-14 | End: 2024-06-14 | Stop reason: HOSPADM

## 2024-06-14 RX ADMIN — FENTANYL CITRATE 50 MCG: 50 INJECTION, SOLUTION INTRAMUSCULAR; INTRAVENOUS at 09:49

## 2024-06-14 RX ADMIN — BUPIVACAINE HYDROCHLORIDE 20 ML: 2.5 INJECTION, SOLUTION EPIDURAL; INFILTRATION; INTRACAUDAL; PERINEURAL at 09:50

## 2024-06-14 RX ADMIN — Medication 20 MG: at 11:00

## 2024-06-14 RX ADMIN — BUPIVACAINE 20 ML: 13.3 INJECTION, SUSPENSION, LIPOSOMAL INFILTRATION at 09:50

## 2024-06-14 RX ADMIN — PHENYLEPHRINE HYDROCHLORIDE 100 MCG: 10 INJECTION INTRAVENOUS at 10:57

## 2024-06-14 RX ADMIN — FENTANYL CITRATE 100 MCG: 50 INJECTION INTRAMUSCULAR; INTRAVENOUS at 10:20

## 2024-06-14 RX ADMIN — ACETAMINOPHEN 975 MG: 325 TABLET, FILM COATED ORAL at 12:47

## 2024-06-14 RX ADMIN — MIDAZOLAM 1 MG: 1 INJECTION INTRAMUSCULAR; INTRAVENOUS at 09:49

## 2024-06-14 RX ADMIN — PROPOFOL 170 MG: 10 INJECTION, EMULSION INTRAVENOUS at 10:20

## 2024-06-14 RX ADMIN — SODIUM CHLORIDE, POTASSIUM CHLORIDE, SODIUM LACTATE AND CALCIUM CHLORIDE: 600; 310; 30; 20 INJECTION, SOLUTION INTRAVENOUS at 10:18

## 2024-06-14 RX ADMIN — LIDOCAINE HYDROCHLORIDE 100 MG: 20 INJECTION, SOLUTION INFILTRATION; PERINEURAL at 10:20

## 2024-06-14 RX ADMIN — GLYCOPYRROLATE 0.4 MG: 0.2 INJECTION, SOLUTION INTRAMUSCULAR; INTRAVENOUS at 11:01

## 2024-06-14 RX ADMIN — ONDANSETRON 4 MG: 2 INJECTION INTRAMUSCULAR; INTRAVENOUS at 11:41

## 2024-06-14 RX ADMIN — METRONIDAZOLE 500 MG: 500 INJECTION, SOLUTION INTRAVENOUS at 10:07

## 2024-06-14 RX ADMIN — GLYCOPYRROLATE 0.2 MG: 0.2 INJECTION, SOLUTION INTRAMUSCULAR; INTRAVENOUS at 10:55

## 2024-06-14 RX ADMIN — Medication 200 MG: at 11:50

## 2024-06-14 RX ADMIN — OXYCODONE HYDROCHLORIDE 5 MG: 5 TABLET ORAL at 13:36

## 2024-06-14 RX ADMIN — GLYCOPYRROLATE 0.2 MG: 0.2 INJECTION, SOLUTION INTRAMUSCULAR; INTRAVENOUS at 10:58

## 2024-06-14 RX ADMIN — Medication 2 G: at 10:34

## 2024-06-14 RX ADMIN — SCOPOLAMINE 1 PATCH: 1 PATCH TRANSDERMAL at 10:12

## 2024-06-14 RX ADMIN — SODIUM CHLORIDE, POTASSIUM CHLORIDE, SODIUM LACTATE AND CALCIUM CHLORIDE: 600; 310; 30; 20 INJECTION, SOLUTION INTRAVENOUS at 10:04

## 2024-06-14 RX ADMIN — HEPARIN SODIUM 5000 UNITS: 5000 INJECTION, SOLUTION INTRAVENOUS; SUBCUTANEOUS at 10:02

## 2024-06-14 RX ADMIN — Medication 50 MG: at 10:23

## 2024-06-14 RX ADMIN — APREPITANT 40 MG: 40 CAPSULE ORAL at 09:09

## 2024-06-14 ASSESSMENT — ACTIVITIES OF DAILY LIVING (ADL)
ADLS_ACUITY_SCORE: 31
ADLS_ACUITY_SCORE: 29
ADLS_ACUITY_SCORE: 31

## 2024-06-14 NOTE — OP NOTE
[facility]  Gynecologic Oncology  Operative Note      Name: Kyra Boogie  MRN: [id number]  : 1970  Date of surgery: 2024    Preoperative diagnosis:  - FIGO grade 1 endometrial adenocarcinoma    Postoperative diagnosis:  - Same as above, s/p procedure    Procedure(s):  - Exam under anesthesia  - Total laparoscopic hysterectomy, bilateral salpingo-oophorectomy, sentinel lymph node dissection    Surgeon: Hugo Sim MD  Resident: Parul Costello MD PGY-4    Anesthesia: GETA  EBL: 30 mL  Urine output: 300 mL clear yellow urine  IV fluids: 1000 mL crystalloid  Antibiotics: 2g Ancef, 500 mg Flagyl  Drains: none    Specimens:  ID Type Source Tests Collected by Time Destination   1 : Right Pelvic Anna Lymph Node Tissue Lymph Node(s), Anna SURGICAL PATHOLOGY EXAM Hugo Sim MD 2024 11:07 AM    2 : Left Pelvic Anna Lymph Node Tissue Lymph Node(s), Anna SURGICAL PATHOLOGY EXAM Hugo Sim MD 2024 11:13 AM    3 : Uterus, cervix, bilateral fallopian tubes and bilateral ovaries Tissue Uterus, Cervix, Bilateral Fallopian Tubes & Ovaries SURGICAL PATHOLOGY EXAM Hugo Sim MD 2024 11:23 AM         Findings:   Exam under anesthesia revealed normal external female genitalia. Uterus small, mobile, anteverted. Normal appearing multiparous cervix. No adnexal masses noted on bimanual exam. No trauma noted upon entry of the abdomen with the laparoscope. Normal appearing bilateral fallopian tubes, ovaries, uterus. Normal appearing liver edge, gallbladder, stomach, bowels. Appendix not visualized. Hemostatic surgical sites bilaterally. No intraabdominal adhesions noted. Bilateral sentinel lymph nodes mapped to the obturator space.     Complications: None apparent    Indication:    Kyra Boogie is a 54 year old patient with a new diagnosis of FIGO grade 1 endometrioid adenocarcinoma of the endometrium.  MMR intact. A total laparoscopic  hysterectomy and bilateral salpingoopherectomy with sentinel lymph node dissection were recommended. The risks and benefits of the procedure were discussed including but not limited to infection, bleeding, injury to abdominal structures, and possible need for open surgery. Surgical consent was obtained.    Procedure:   The patient was taken to the OR where general endotracheal anesthesia was administered without complication. She was placed in a dorsal lithotomy position using yellow fin stirrups. Exam under anesthesia revealed the above listed findings. She was then prepped and draped in usual sterile fashion. A mendoza catheter was placed in the bladder. A surgical time out was completed. A speculum was placed in the vagina. The cervix was grasped with a tenaculum and ICG was injected at 3 o'clock and 9 o'clock. Then a small V-care uterine manipulator was placed and used for uterine manipulation. The speculum was removed.     Attention was then turned towards the abdomen. Perforating towel clamps were then placed on either side of the umbilicus. The umbilicus was then entered with a Veress needle into the abdomen. Intraabdominal placement was verified with a saline drop test and opening pressure of less than 5 mmHg. The abdomen was insufflated to a pressure of 15 mmHg. A 5 mm port was then placed through the umbilicus with the Visiport technique, and a 5 mm laparoscope was introduced. Laparoscopic findings were as noted above. The patient was placed in Trendelenburg position. Two additional ports were placed in the left and right lower quadrants under direct visualization, both 5 mm. The bowel was swept out of the pelvis. The right ureter was identified and the right round ligament was grasped and pulled medially. The round ligament was then transected using a Monopolar scissors and the posterior leaf of the broad ligament was opened up lateral to the IP ligament. The right sentinel lymph node was identified and  carefully dissected and sent for pathology. A defect was then created in the posterior peritoneum medial to the IP ligament and the IP was grasped, desiccated, and transected using a Ligasure device. The posterior broad ligament was then followed medially to the level of the uterus, coming across the uterine vessels. This procedure was then repeated on the contralateral side with the left sentinel lymph node identified dissected and sent for pathology. Next, the anterior broad ligament was taken down bilaterally and medially to create a bladder flap. The V-care cup was identified. Uterine arteries were dissected off of the cervix by grasping just lateral to the cervix, desiccating, and cutting until the uterine arteries were dissected off past the level of the cup. Colpotomy was then performed with Monopolar scissors. The uterus was removed from the vagina and sent to pathology. A v-lock suture was passed through the vagina and then a vaginal occluder balloon was placed in the vagina for pneumoperitoneum. The vaginal cuff was closed with a running stitch using V-lock. The patient was taken out of trendelenburg position. The vaginal cuff was irrigated and excellent hemostasis was assured. The ports were removed and skin was then sutured with 4-0 vicryl suture and dermabond was applied. The patient tolerated the procedure well. She was awoken from anesthesia without difficulty and was transported to PACU in a stable condition.  Dr. Sim was scrubbed and present for the entire duration of the procedure.    Parul Costello MD  Ob/Gyn Resident, PGY-4  06/14/2024 11:57 AM

## 2024-06-14 NOTE — OR NURSING
Neuraxial or Regional Block, Transversus abdominis plane block performed without complications.  VSS.  Pt tolerated well.  Will continue to monitor.

## 2024-06-14 NOTE — ANESTHESIA PROCEDURE NOTES
Quadratus lumborum Procedure Note    Pre-Procedure   Staff -        Anesthesiologist:  Damion Rhodes MD       Resident/Fellow: Eveline Neff MD       Performed By: with residents       Procedure performed by resident/fellow/CRNA in presence of a teaching physician.         Location: pre-op       Procedure Start/Stop Times: 6/14/2024 9:50 AM and 6/14/2024 10:05 AM       Pre-Anesthestic Checklist: patient identified, IV checked, site marked, risks and benefits discussed, informed consent, monitors and equipment checked, pre-op evaluation, at physician/surgeon's request and post-op pain management  Timeout:       Correct Patient: Yes        Correct Procedure: Yes        Correct Site: Yes        Correct Position: Yes        Correct Laterality: Yes        Site Marked: Yes  Procedure Documentation  Procedure: Quadratus lumborum       Laterality: bilateral       Patient Position: supine       Skin prep: Chloraprep       Insertion Site: T8-9.       Needle Type: short bevel       Needle Gauge: 21.        Needle Length (millimeters): 110        Ultrasound guided       1. Ultrasound was used to identify targeted nerve, plexus, vascular marker, or fascial plane and place a needle adjacent to it in real-time.       2. Ultrasound was used to visualize the spread of anesthetic in close proximity to the above referenced structure.       3. A permanent image is entered into the patient's record.    Assessment/Narrative         The placement was negative for: blood aspirated, painful injection and site bleeding       Paresthesias: No.       Bolus given via needle..        Secured via.        Insertion/Infusion Method: Single Shot       Complications: none    Medication(s) Administered   Bupivacaine 0.25% PF (Infiltration) - Infiltration   20 mL - 6/14/2024 9:50:00 AM  Bupivacaine liposome (Exparel) 1.3% LA inj susp (Infiltration) - Infiltration   20 mL - 6/14/2024 9:50:00 AM  Medication Administration Time: 6/14/2024  "9:50 AM      FOR [facility] ([facility]) ONLY:   Pain Team Contact information: please page the Pain Team Via [employer]. Search \"Pain\". During daytime hours, please page the attending first. At night please page the resident first.      "

## 2024-06-14 NOTE — ANESTHESIA PROCEDURE NOTES
Airway       Patient location during procedure: OR       Procedure Start/Stop Times: 6/14/2024 10:27 AM  Staff -        Anesthesiologist:  Fay Sellers MD       Resident/Fellow: Roxana Osborne MD       Performed By: resident  Consent for Airway        Urgency: elective  Indications and Patient Condition       Indications for airway management: guerline-procedural       Induction type:intravenous       Mask difficulty assessment: 2 - vent by mask + OA or adjuvant +/- NMBA    Final Airway Details       Final airway type: endotracheal airway       Successful airway: ETT - single and Oral  Endotracheal Airway Details        ETT size (mm): 7.0       Cuffed: yes       Successful intubation technique: flexible bronchoscopy       Grade View of Cords: 1       Adjucts: stylet       Position: Left       Measured from: gums/teeth       Secured at (cm): 21       Bite block used: None    Post intubation assessment        Placement verified by: capnometry, equal breath sounds and chest rise        Number of attempts at approach: 1       Number of other approaches attempted: 0       Secured with: silk tape       Ease of procedure: easy       Dentition: Intact and Unchanged    Medication(s) Administered   Medication Administration Time: 6/14/2024 10:27 AM

## 2024-06-14 NOTE — PROGRESS NOTES
"Gynecologic Oncology   Postoperative Check  06/14/2024    S:   Patient reports she is doing well postoperatively. Pain is well controlled with oral medications. Ambulating without dizziness. Voiding spontaneously. Tolerating crackers and water without nausea or vomiting. Bleeding is minimal. Denies chest pain, shortness of breath, dizziness, or other concerns at this time.     O:  Patient Vitals for the past 24 hrs:   BP Temp Temp src Pulse Resp SpO2 Height Weight   06/14/24 0833 119/72 97.4  F (36.3  C) Oral 60 17 99 % 1.727 m (5' 8\") 93.5 kg (206 lb 2.1 oz)       Gen: NAD  Cardio: RRR, S1/S2, no murmurs  Resp: CTAB, no wheezing or crackles  Abdomen: soft, appropriately tender, non distended, 4 incisions c/d/i  Extremities: Non-tender, trace edema    A/P:   54 year old POD#0 s/p EUA, TLH, BSO. Doing well postoperatively. Appropriate for discharge. Reviewed discharge instructions and precautions. She will follow-up post-op with Dr. Sim.    #Postoperative State  - Reviewed intraoperative findings  - Hgb 14.7 > EBL 30. VSS. Appropriate surgical blood loss. No signs of ongoing bleeding  - Pain management: PO pain meds PRN  - Regular diet  - Discharge with bowel regimen  - Voiding spontaneously  - Discharge to home    Dispo: To home with postop follow-up    Parul Costello MD  Ob/Gyn Resident, PGY-4  06/14/2024 3:56 PM  "

## 2024-06-14 NOTE — ANESTHESIA POSTPROCEDURE EVALUATION
Patient: Kyra Boogie    Procedure: Procedure(s):  Total Laparoscopic Hysterectomy, Bilateral Salpingo-Oophorectomy, bilateral [place] Lymph Node Dissection       Anesthesia Type:  General    Note:  Disposition: Outpatient   Postop Pain Control: Uneventful            Sign Out: Well controlled pain   PONV: No   Neuro/Psych: Uneventful            Sign Out: Acceptable/Baseline neuro status   Airway/Respiratory: Uneventful            Sign Out: Acceptable/Baseline resp. status   CV/Hemodynamics: Uneventful            Sign Out: Acceptable CV status; No obvious hypovolemia; No obvious fluid overload   Other NRE: NONE   DID A NON-ROUTINE EVENT OCCUR? No           Last vitals:  Vitals Value Taken Time   /79 06/14/24 1230   Temp 36.4  C (97.5  F) 06/14/24 1203   Pulse 78 06/14/24 1233   Resp 18 06/14/24 1233   SpO2 99 % 06/14/24 1233   Vitals shown include unfiled device data.    Electronically Signed By: Fay Sellers MD  June 14, 2024  12:34 PM

## 2024-06-14 NOTE — DISCHARGE INSTRUCTIONS
Contacting your Doctor -   To contact a doctor, call Dr Sim at [phone] at the [facility]ologic Clinic or:  [phone] and ask for the resident on call for Gynecology (answered 24 hours a day)  911 if you are in need of immediate or emergent help       Scopolamine Patch- (Absorbed through the skin)    Prevents nausea and vomiting caused by motion sickness or anesthesia and surgery in adults.    Brand Name(s): Transderm Scop, Transderm-Scope  There may be other brand names for this medicine.    How to Use This Medicine:  Patch  After you take off the patch, wash the place where the patch was and your hands thoroughly.    How to Dispose of This Medicine:    Fold the used patch in half with the sticky sides together. Throw any used patch away so that children or pets cannot get to it. You will also need to throw away old patches after the expiration date has passed.  Keep all medicine away from children and never share your medicine with anyone.    Drugs and Foods to Avoid:  Ask your doctor or pharmacist before using any other medicine, including over-the-counter medicines, vitamins, and herbal products.    Tell your doctor if you are using any medicines that make you sleepy. These include sleeping pills, cold and allergy medicine, narcotic pain relievers, and sedatives.   Do not drink alcohol while you are using this medicine.    Possible Side Effects While Using This Medicine:  Call your doctor right away if you notice any of these side effects:    Allergic reaction: Itching or hives, swelling in your face or hands, swelling or tingling in your mouth or throat, chest tightness, trouble breathing.  Blurred vision,  Confusion or memory loss.  Fast,slow, or uneven heartbeat.  Lightheadedness, dizziness, drowsiness, or fainting.  Seeing, hearing, or feeling things that are not there.  Severe eye pain.  Trouble urinating.      If you notice these less serious side effects, talk with your doctor:    Dry  mouth.  Dry, itchy, or red eyes.  Restlessness  Skin rash or redness.      If you experience these side effects after you are discharged from the hospital - REMOVE your patch after 24 hours - 6/15/24 0800 AM.

## 2024-06-14 NOTE — ANESTHESIA CARE TRANSFER NOTE
Patient: Kyra Boogie    Procedure: Procedure(s):  Total Laparoscopic Hysterectomy, Bilateral Salpingo-Oophorectomy, bilateral [place] Lymph Node Dissection       Diagnosis: Endometrial cancer (H) [C54.1]  Diagnosis Additional Information: No value filed.    Anesthesia Type:   General     Note:    Oropharynx: oropharynx clear of all foreign objects  Level of Consciousness: awake  Oxygen Supplementation: face mask  Level of Supplemental Oxygen (L/min / FiO2): 4  Independent Airway: airway patency satisfactory and stable  Dentition: dentition unchanged  Vital Signs Stable: post-procedure vital signs reviewed and stable  Report to RN Given: handoff report given  Patient transferred to: PACU    Handoff Report: Identifed the Patient, Identified the Reponsible Provider, Reviewed the pertinent medical history, Discussed the surgical course, Reviewed Intra-OP anesthesia mangement and issues during anesthesia, Set expectations for post-procedure period and Allowed opportunity for questions and acknowledgement of understanding      Vitals:  Vitals Value Taken Time   BP     Temp     Pulse 84 06/14/24 1209   Resp 18 06/14/24 1209   SpO2 100 % 06/14/24 1209   Vitals shown include unfiled device data.    Electronically Signed By: Roxana Osborne MD  June 14, 2024  12:09 PM  
Moderate Variability

## 2024-06-23 NOTE — PROGRESS NOTES
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    Kyra is overall doing well.  She reports that her pain is minimal.  She denies any vaginal bleeding.  She reports normal urination and bowel movements.  She reports normal appetite.  She has no concerns today.    The patient presented for her annual exam and was noted to have postmenopausal bleeding.  She had a pelvic ultrasound performed.    2024 pelvic ultrasound: Uterus normal in size measuring 8 cm.  Endometrium 16 mm thick.  Right ovary and left ovary normal.  No free fluid.    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  MMR intact.    24 TLH, BSO, bilateral sentinel lymph node biopsies   Path: Pending    Screening History  Colonoscopy: 5/15/2023 normal  Mammogram: 2022 benign  Cervical cancer screenin2019 NILM/HPV negative    OB/Gynecologic History:  Deliveries:  ,  x3  Menopausal status: as above  History of HRT: No  Cervical cancer screening: See screening history above. Additional details:   Regular screening: Yes  History of abnormal: Yes many years ago but normal since.  No procedures on cervix.       Past Medical History:   Diagnosis Date    Anxiety     Arthritis 2016    Major depressive disorder, recurrent episode, mild (H24) 2016    Papanicolaou smear of cervix with atypical squamous cells of undetermined significance (ASC-US) 2011    Plantar fasciitis, bilateral     s/p surgery, b/l didnt wrk on left , has residual pain left foot adn heel chronically numb and cold at times,  been checekd for DM and reports negative in the past. seen by podiatry adn was applying voltaren gel    PONV (postoperative nausea and vomiting)     Psychophysiological insomnia 2016   Undifferentiated connective tissue disorder- hydroxychloroquine, celebrex, and bupropion    Past Surgical History:   Procedure Laterality Date    BUNIONECTOMY      left foot    LAPAROSCOPIC CHOLECYSTECTOMY      LAPAROSCOPIC HYSTERECTOMY TOTAL, BILATERAL SALPINGO-OOPHORECTOMY, NODE DISSECTION, COMBINED N/A 2024    Procedure: Total Laparoscopic Hysterectomy, Bilateral Salpingo-Oophorectomy, bilateral [place] Lymph Node Dissection;  Surgeon: Hugo Sim MD;  Location: [facility] OR    RELEASE PLANTAR FASCIA      b/l        Social History     Tobacco Use    Smoking status: Former     Current packs/day: 0.00     Average packs/day: 1 pack/day for 10.0 years (10.0 ttl pk-yrs)     Types: Cigarettes     Start date: 1990     Quit date: 2000     Years since quittin.4   Substance Use Topics    Alcohol use: No     Comment: couple drinks on weekend    Drug use: No   Current living situation: Lives  in [place] near [place] with her .  She is a teacher for early childhood special education.      Family History   Problem Relation Age of Onset    Prostate Cancer Father     Asthma Daughter     Arthritis Other         other close relatives    Cancer Father         leukemia , other close relatives    Eczema Daughter     Glaucoma Other         other close relatives    Rhinitis Daughter     Hypertension Father     Migraines Mother     Osteoporosis Other         other close relatives       Allergies  Allergies   Allergen Reactions    Other [No Clinical Screening - See Comments]      Cat scan dye    Seasonal Allergies        Current Outpatient Medications   Medication Sig Dispense Refill    acetaminophen (TYLENOL) 325 MG tablet Take 2 tablets (650 mg) by mouth every 6 hours as needed for mild pain 24 tablet 0     albuterol (PROAIR HFA, PROVENTIL HFA, VENTOLIN HFA) 108 (90 BASE) MCG/ACT inhaler Inhale 2 puffs into the lungs every 6 hours as needed for shortness of breath / dyspnea or wheezing 3 Inhaler 1    buPROPion (WELLBUTRIN SR) 150 MG 12 hr tablet Take 300 mg by mouth daily      celecoxib (CELEBREX) 200 MG capsule Take 200 mg by mouth 2 times daily      cinnamon 500 MG CAPS Take 1 capsule by mouth daily      diclofenac (VOLTAREN) 1 % GEL Apply 4 grams to knees or 2 grams to hands four times daily using enclosed dosing card. 100 g 1    Flaxseed, Linseed, (FLAX SEED OIL) 1000 MG capsule       HERBALS Tumeric - 2 to 3 capsules po bid      hydroxychloroquine (PLAQUENIL) 200 MG tablet Take 200 mg by mouth 2 times daily (with meals)      ibuprofen (ADVIL/MOTRIN) 600 MG tablet Take 1 tablet (600 mg) by mouth every 6 hours as needed for other (mild and/or inflammatory pain.) 12 tablet 0    Levomefolate Glucosamine (METHYL-FOLATE PO) Place 1 Dropperette under the tongue daily      LORazepam (ATIVAN) 0.5 MG tablet Take 0.5 tablets (0.25 mg) by mouth every 8 hours as needed for anxiety (panic attack) 10 tablet 0    magnesium citrate 1.745 GM/30ML solution       MELATONIN PO 3 to 5 mg po hs prn      Multiple Vitamins-Minerals (MULTIVITAMIN ADULT PO)       ondansetron (ZOFRAN ODT) 4 MG ODT tab Take 1 tablet (4 mg) by mouth every 8 hours as needed for nausea 4 tablet 0    ondansetron (ZOFRAN) 4 MG tablet Take 4 mg by mouth every 6 hours as needed      oxyCODONE (ROXICODONE) 5 MG tablet Take 1-2 tablets (5-10 mg) by mouth every 4 hours as needed for moderate to severe pain 6 tablet 0    senna-docusate (SENOKOT-S/PERICOLACE) 8.6-50 MG tablet Take 1-2 tablets by mouth 2 times daily 30 tablet 0    VITAMIN D, CHOLECALCIFEROL, PO Take by mouth daily       No current facility-administered medications for this visit.       Physical Exam:   /73 (BP Location: Right arm, Patient Position: Chair, Cuff Size: Adult Large)   Pulse 69   Ht  "1.727 m (5' 8\")   Wt 95.3 kg (210 lb)   SpO2 98%   BMI 31.93 kg/m    General Appearance: healthy and alert, no distress        Cardiovascular: Well-perfused    Respiratory: Regular work of breathing     Gastrointestinal:       4 laparoscopic incisions healing well.  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  MMR intact.  She is now status post surgical resection and pathology still pending.    Plan:   Reviewed with the patient that her pathology is still pending.  Discussed that I will call her as soon as the pathology has returned with recommended adjuvant treatment and follow-up plan.      Hugo Sim MD             "

## 2024-06-24 ENCOUNTER — ONCOLOGY VISIT (OUTPATIENT)
Dept: ONCOLOGY | Facility: CLINIC | Age: 54
End: 2024-06-24
Attending: OBSTETRICS & GYNECOLOGY
Payer: COMMERCIAL

## 2024-06-24 VITALS
BODY MASS INDEX: 31.83 KG/M2 | OXYGEN SATURATION: 98 % | SYSTOLIC BLOOD PRESSURE: 129 MMHG | WEIGHT: 210 LBS | DIASTOLIC BLOOD PRESSURE: 73 MMHG | HEIGHT: 68 IN | HEART RATE: 69 BPM

## 2024-06-24 DIAGNOSIS — C54.1 ENDOMETRIAL CANCER (H): Primary | ICD-10-CM

## 2024-06-24 LAB
INTERPRETATION: NORMAL
PATH REPORT.COMMENTS IMP SPEC: ABNORMAL
PATH REPORT.COMMENTS IMP SPEC: YES
PATH REPORT.FINAL DX SPEC: ABNORMAL
PATH REPORT.GROSS SPEC: ABNORMAL
PATH REPORT.MICROSCOPIC SPEC OTHER STN: ABNORMAL
PATH REPORT.RELEVANT HX SPEC: ABNORMAL
PATHOLOGY SYNOPTIC REPORT: ABNORMAL
PHOTO IMAGE: ABNORMAL
SIGNIFICANT RESULTS: NORMAL
SPECIMEN DESCRIPTION: NORMAL
TEST DETAILS, MDL: NORMAL

## 2024-06-24 PROCEDURE — G2211 COMPLEX E/M VISIT ADD ON: HCPCS | Performed by: OBSTETRICS & GYNECOLOGY

## 2024-06-24 PROCEDURE — 99213 OFFICE O/P EST LOW 20 MIN: CPT | Performed by: OBSTETRICS & GYNECOLOGY

## 2024-06-24 PROCEDURE — 99214 OFFICE O/P EST MOD 30 MIN: CPT | Performed by: OBSTETRICS & GYNECOLOGY

## 2024-06-24 ASSESSMENT — PAIN SCALES - GENERAL: PAINLEVEL: NO PAIN (0)

## 2024-06-24 NOTE — LETTER
2024      Kyra Boogie  [id number] [facility] [address]      Dear Colleague,    Thank you for referring your patient, Kyra Boogie, to [facility]. Please see a copy of my visit note below.    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    Kyra is overall doing well.  She reports that her pain is minimal.  She denies any vaginal bleeding.  She reports normal urination and bowel movements.  She reports normal appetite.  She has no concerns today.    The patient presented for her annual exam and was noted to have postmenopausal bleeding.  She had a pelvic ultrasound performed.    2024 pelvic ultrasound: Uterus normal in size measuring 8 cm.  Endometrium 16 mm thick.  Right ovary and left ovary normal.  No free fluid.    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  MMR intact.    24 TLH, BSO, bilateral sentinel lymph node biopsies   Path: Pending    Screening History  Colonoscopy: 5/15/2023 normal  Mammogram: 2022 benign  Cervical cancer screenin2019 NILM/HPV negative    OB/Gynecologic History:  Deliveries:  ,  x3  Menopausal status: as above  History of HRT: No  Cervical cancer screening: See screening history above. Additional details:   Regular screening: Yes  History of abnormal: Yes many years ago but normal since.  No procedures on cervix.       Past Medical History:   Diagnosis Date     Anxiety      Arthritis 2016     Major depressive disorder, recurrent episode, mild (H24) 2016      Papanicolaou smear of cervix with atypical squamous cells of undetermined significance (ASC-US) 2011     Plantar fasciitis, bilateral     s/p surgery, b/l didnt wrk on left , has residual pain left foot adn heel chronically numb and cold at times, been checekd for DM and reports negative in the past. seen by podiatry adn was applying voltaren gel     PONV (postoperative nausea and vomiting)      Psychophysiological insomnia 2016   Undifferentiated connective tissue disorder- hydroxychloroquine, celebrex, and bupropion    Past Surgical History:   Procedure Laterality Date     BUNIONECTOMY  2012    left foot     LAPAROSCOPIC CHOLECYSTECTOMY       LAPAROSCOPIC HYSTERECTOMY TOTAL, BILATERAL SALPINGO-OOPHORECTOMY, NODE DISSECTION, COMBINED N/A 2024    Procedure: Total Laparoscopic Hysterectomy, Bilateral Salpingo-Oophorectomy, bilateral [place] Lymph Node Dissection;  Surgeon: Hugo Sim MD;  Location: [facility] OR     RELEASE PLANTAR FASCIA      b/l        Social History     Tobacco Use     Smoking status: Former     Current packs/day: 0.00     Average packs/day: 1 pack/day for 10.0 years (10.0 ttl pk-yrs)     Types: Cigarettes     Start date: 1990     Quit date: 2000     Years since quittin.4   Substance Use Topics     Alcohol use: No     Comment: couple drinks on weekend     Drug use: No   Current living situation: Lives  in [place] near [place] with her .  She is a teacher for early childhood special education.      Family History   Problem Relation Age of Onset     Prostate Cancer Father      Asthma Daughter      Arthritis Other         other close relatives     Cancer Father         leukemia , other close relatives     Eczema Daughter      Glaucoma Other         other close relatives     Rhinitis Daughter      Hypertension Father      Migraines Mother      Osteoporosis Other         other close relatives       Allergies  Allergies   Allergen Reactions     Other [No  Clinical Screening - See Comments]      Cat scan dye     Seasonal Allergies        Current Outpatient Medications   Medication Sig Dispense Refill     acetaminophen (TYLENOL) 325 MG tablet Take 2 tablets (650 mg) by mouth every 6 hours as needed for mild pain 24 tablet 0     albuterol (PROAIR HFA, PROVENTIL HFA, VENTOLIN HFA) 108 (90 BASE) MCG/ACT inhaler Inhale 2 puffs into the lungs every 6 hours as needed for shortness of breath / dyspnea or wheezing 3 Inhaler 1     buPROPion (WELLBUTRIN SR) 150 MG 12 hr tablet Take 300 mg by mouth daily       celecoxib (CELEBREX) 200 MG capsule Take 200 mg by mouth 2 times daily       cinnamon 500 MG CAPS Take 1 capsule by mouth daily       diclofenac (VOLTAREN) 1 % GEL Apply 4 grams to knees or 2 grams to hands four times daily using enclosed dosing card. 100 g 1     Flaxseed, Linseed, (FLAX SEED OIL) 1000 MG capsule        HERBALS Tumeric - 2 to 3 capsules po bid       hydroxychloroquine (PLAQUENIL) 200 MG tablet Take 200 mg by mouth 2 times daily (with meals)       ibuprofen (ADVIL/MOTRIN) 600 MG tablet Take 1 tablet (600 mg) by mouth every 6 hours as needed for other (mild and/or inflammatory pain.) 12 tablet 0     Levomefolate Glucosamine (METHYL-FOLATE PO) Place 1 Dropperette under the tongue daily       LORazepam (ATIVAN) 0.5 MG tablet Take 0.5 tablets (0.25 mg) by mouth every 8 hours as needed for anxiety (panic attack) 10 tablet 0     magnesium citrate 1.745 GM/30ML solution        MELATONIN PO 3 to 5 mg po hs prn       Multiple Vitamins-Minerals (MULTIVITAMIN ADULT PO)        ondansetron (ZOFRAN ODT) 4 MG ODT tab Take 1 tablet (4 mg) by mouth every 8 hours as needed for nausea 4 tablet 0     ondansetron (ZOFRAN) 4 MG tablet Take 4 mg by mouth every 6 hours as needed       oxyCODONE (ROXICODONE) 5 MG tablet Take 1-2 tablets (5-10 mg) by mouth every 4 hours as needed for moderate to severe pain 6 tablet 0     senna-docusate (SENOKOT-S/PERICOLACE) 8.6-50 MG tablet Take  "1-2 tablets by mouth 2 times daily 30 tablet 0     VITAMIN D, CHOLECALCIFEROL, PO Take by mouth daily       No current facility-administered medications for this visit.       Physical Exam:   /73 (BP Location: Right arm, Patient Position: Chair, Cuff Size: Adult Large)   Pulse 69   Ht 1.727 m (5' 8\")   Wt 95.3 kg (210 lb)   SpO2 98%   BMI 31.93 kg/m    General Appearance: healthy and alert, no distress        Cardiovascular: Well-perfused    Respiratory: Regular work of breathing     Gastrointestinal:       4 laparoscopic incisions healing well.  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  MMR intact.  She is now status post surgical resection and pathology still pending.    Plan:   Reviewed with the patient that her pathology is still pending.  Discussed that I will call her as soon as the pathology has returned with recommended adjuvant treatment and follow-up plan.      Hugo Sim MD               Again, thank you for allowing me to participate in the care of your patient.        Sincerely,        Hugo Sim MD  "

## 2024-06-24 NOTE — NURSING NOTE
"Oncology Rooming Note    June 24, 2024 1:39 PM   Kyra Boogie is a 54 year old female who presents for:    Chief Complaint   Patient presents with    Oncology Clinic Visit     Post Op     Initial Vitals: /73 (BP Location: Right arm, Patient Position: Chair, Cuff Size: Adult Large)   Pulse 69   Ht 1.727 m (5' 8\")   Wt 95.3 kg (210 lb)   SpO2 98%   BMI 31.93 kg/m   Estimated body mass index is 31.93 kg/m  as calculated from the following:    Height as of this encounter: 1.727 m (5' 8\").    Weight as of this encounter: 95.3 kg (210 lb). Body surface area is 2.14 meters squared.  No Pain (0) Comment: Data Unavailable   No LMP recorded. Patient is postmenopausal.  Allergies reviewed: Yes  Medications reviewed: Yes    Medications: Medication refills not needed today.  Pharmacy name entered into "[employer]: [facility] [address] [place] [address]    Frailty Screening:   Is the patient here for a new oncology consult visit in cancer care? 2. No      Clinical concerns: NO       Coni Hussein CMA              "

## 2024-06-25 PROCEDURE — G0452 MOLECULAR PATHOLOGY INTERPR: HCPCS | Mod: 26 | Performed by: STUDENT IN AN ORGANIZED HEALTH CARE EDUCATION/TRAINING PROGRAM

## 2024-06-25 PROCEDURE — 81479 UNLISTED MOLECULAR PATHOLOGY: CPT | Performed by: OBSTETRICS & GYNECOLOGY

## 2024-10-15 ENCOUNTER — TELEPHONE (OUTPATIENT)
Dept: ONCOLOGY | Facility: CLINIC | Age: 54
End: 2024-10-15
Payer: COMMERCIAL

## 2024-10-15 NOTE — TELEPHONE ENCOUNTER
Left message for patient following up to reschedule Dr. Sim visit as she had to cancel due to family conflict. -HB

## 2025-01-10 NOTE — PROGRESS NOTES
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 MMRp.    Kyra is overall doing well.  She denies any vaginal bleeding.  She reports normal urination and bowel movements.  She reports normal appetite.  No painl. She has no concerns today.    Oncology History:  The patient presented for her annual exam and was noted to have postmenopausal bleeding.  She had a pelvic ultrasound performed.    2024 pelvic ultrasound: Uterus normal in size measuring 8 cm.  Endometrium 16 mm thick.  Right ovary and left ovary normal.  No free fluid.    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  MMR intact.    24 TLH, BSO, bilateral sentinel lymph node biopsies   Path: FIGO grade 2 endometrioid adenocarcinoma of the endometrium. 31% myometrial invasion. Negative LVSI. Bilateral SLNB negative.     Screening History  Colonoscopy: 5/15/2023 normal  Mammogram: 2022 benign  Cervical cancer screenin2019 NILM/HPV negative    OB/Gynecologic History:  Deliveries:  ,  x3  Menopausal status: as above  History of HRT: No  Cervical cancer screening: See screening history above. Additional details:   Regular screening: Yes  History of abnormal: Yes many years ago but normal since.  No procedures on cervix.       Past Medical History:   Diagnosis Date    Anxiety     Arthritis 2016    Major depressive disorder, recurrent episode, mild 2016    Papanicolaou smear of cervix with atypical squamous cells of undetermined significance (ASC-US) 2011    Plantar fasciitis, bilateral     s/p  surgery, b/l didnt wrk on left , has residual pain left foot adn heel chronically numb and cold at times, been checekd for DM and reports negative in the past. seen by podiatry adn was applying voltaren gel    PONV (postoperative nausea and vomiting)     Psychophysiological insomnia 2016   Undifferentiated connective tissue disorder- hydroxychloroquine, celebrex, and bupropion    Past Surgical History:   Procedure Laterality Date    BUNIONECTOMY  2012    left foot    LAPAROSCOPIC CHOLECYSTECTOMY      LAPAROSCOPIC HYSTERECTOMY TOTAL, BILATERAL SALPINGO-OOPHORECTOMY, NODE DISSECTION, COMBINED N/A 2024    Procedure: Total Laparoscopic Hysterectomy, Bilateral Salpingo-Oophorectomy, bilateral [place] Lymph Node Dissection;  Surgeon: Hugo Sim MD;  Location: [facility] OR    RELEASE PLANTAR FASCIA      b/l        Social History     Tobacco Use    Smoking status: Former     Current packs/day: 0.00     Average packs/day: 1 pack/day for 10.0 years (10.0 ttl pk-yrs)     Types: Cigarettes     Start date: 1990     Quit date: 2000     Years since quittin.9   Substance Use Topics    Alcohol use: No     Comment: couple drinks on weekend    Drug use: No   Current living situation: Lives in [place] near [place] with her .  She is a teacher for early childhood special education.      Family History   Problem Relation Age of Onset    Prostate Cancer Father     Asthma Daughter     Arthritis Other         other close relatives    Cancer Father         leukemia , other close relatives    Eczema Daughter     Glaucoma Other         other close relatives    Rhinitis Daughter     Hypertension Father     Migraines Mother     Osteoporosis Other         other close relatives       Allergies  Allergies   Allergen Reactions    Other [No Clinical Screening - See Comments]      Cat scan dye    Seasonal Allergies        Current Outpatient Medications   Medication Sig Dispense Refill    acetaminophen (TYLENOL)  325 MG tablet Take 2 tablets (650 mg) by mouth every 6 hours as needed for mild pain 24 tablet 0    albuterol (PROAIR HFA, PROVENTIL HFA, VENTOLIN HFA) 108 (90 BASE) MCG/ACT inhaler Inhale 2 puffs into the lungs every 6 hours as needed for shortness of breath / dyspnea or wheezing 3 Inhaler 1    buPROPion (WELLBUTRIN SR) 150 MG 12 hr tablet Take 300 mg by mouth daily      celecoxib (CELEBREX) 200 MG capsule Take 200 mg by mouth 2 times daily      cinnamon 500 MG CAPS Take 1 capsule by mouth daily      diclofenac (VOLTAREN) 1 % GEL Apply 4 grams to knees or 2 grams to hands four times daily using enclosed dosing card. 100 g 1    Flaxseed, Linseed, (FLAX SEED OIL) 1000 MG capsule       HERBALS Tumeric - 2 to 3 capsules po bid      hydroxychloroquine (PLAQUENIL) 200 MG tablet Take 200 mg by mouth 2 times daily (with meals)      ibuprofen (ADVIL/MOTRIN) 600 MG tablet Take 1 tablet (600 mg) by mouth every 6 hours as needed for other (mild and/or inflammatory pain.) 12 tablet 0    Levomefolate Glucosamine (METHYL-FOLATE PO) Place 1 Dropperette under the tongue daily      LORazepam (ATIVAN) 0.5 MG tablet Take 0.5 tablets (0.25 mg) by mouth every 8 hours as needed for anxiety (panic attack) 10 tablet 0    magnesium citrate 1.745 GM/30ML solution       MELATONIN PO 3 to 5 mg po hs prn      Multiple Vitamins-Minerals (MULTIVITAMIN ADULT PO)       ondansetron (ZOFRAN ODT) 4 MG ODT tab Take 1 tablet (4 mg) by mouth every 8 hours as needed for nausea 4 tablet 0    ondansetron (ZOFRAN) 4 MG tablet Take 4 mg by mouth every 6 hours as needed      oxyCODONE (ROXICODONE) 5 MG tablet Take 1-2 tablets (5-10 mg) by mouth every 4 hours as needed for moderate to severe pain 6 tablet 0    senna-docusate (SENOKOT-S/PERICOLACE) 8.6-50 MG tablet Take 1-2 tablets by mouth 2 times daily 30 tablet 0    VITAMIN D, CHOLECALCIFEROL, PO Take by mouth daily       No current facility-administered medications for this visit.       Physical Exam:   BP  "123/82   Pulse 65   Resp 16   Ht 1.727 m (5' 7.99\")   Wt 93.6 kg (206 lb 6.4 oz)   SpO2 95%   BMI 31.39 kg/m    GENERAL: alert and no distress  EYES: Eyes grossly normal to inspection.  No discharge or erythema, or obvious scleral/conjunctival abnormalities.  RESP: No audible wheeze, cough, or visible cyanosis.    SKIN: Visible skin clear. No significant rash, abnormal pigmentation or lesions.  NEURO: Cranial nerves grossly intact.  Mentation and speech appropriate for age.  PSYCH: Appropriate affect, tone, and pace of words      Gastrointestinal:       Abdomen soft, non-tender, non-distended    Genitourinary: Normal atrophic external genitalia.  Normal vaginal mucosa with moderate prolapse of the vaginal walls anteriorly and posteriorly.  Vaginal cuff normal without lesions or masses.  Bimanual exam without masses or nodularity at the vaginal cuff.    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 MMRp.    Plan:   AIDEE on exam. Continue clinical surveillance every 6 months for 5 years, followed by annually thereafter.  Reviewed signs and symptoms of recurrence and when to contact clinic sooner.  Imaging will be reserved for concerning clinical findings.   -Follow-up with me in 6 months for endometrial cancer surveillance       Hugo Sim MD             "

## 2025-01-13 ENCOUNTER — ONCOLOGY VISIT (OUTPATIENT)
Dept: ONCOLOGY | Facility: CLINIC | Age: 55
End: 2025-01-13
Attending: OBSTETRICS & GYNECOLOGY
Payer: COMMERCIAL

## 2025-01-13 VITALS
SYSTOLIC BLOOD PRESSURE: 123 MMHG | DIASTOLIC BLOOD PRESSURE: 82 MMHG | HEART RATE: 65 BPM | OXYGEN SATURATION: 95 % | BODY MASS INDEX: 31.28 KG/M2 | HEIGHT: 68 IN | WEIGHT: 206.4 LBS | RESPIRATION RATE: 16 BRPM

## 2025-01-13 DIAGNOSIS — C54.1 ENDOMETRIAL CANCER (H): Primary | ICD-10-CM

## 2025-01-13 PROCEDURE — 99213 OFFICE O/P EST LOW 20 MIN: CPT | Performed by: OBSTETRICS & GYNECOLOGY

## 2025-01-13 ASSESSMENT — PAIN SCALES - GENERAL: PAINLEVEL_OUTOF10: NO PAIN (0)

## 2025-01-13 NOTE — LETTER
2025      Kyra Boogie  [id number] [facility] [address]      Dear Colleague,    Thank you for referring your patient, Kyra Boogie, to the [facility]. Please see a copy of my visit note below.    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 MMRp.    Kyra is overall doing well.  She denies any vaginal bleeding.  She reports normal urination and bowel movements.  She reports normal appetite.  No painl. She has no concerns today.    Oncology History:  The patient presented for her annual exam and was noted to have postmenopausal bleeding.  She had a pelvic ultrasound performed.    2024 pelvic ultrasound: Uterus normal in size measuring 8 cm.  Endometrium 16 mm thick.  Right ovary and left ovary normal.  No free fluid.    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  MMR intact.    24 TLH, BSO, bilateral sentinel lymph node biopsies   Path: FIGO grade 2 endometrioid adenocarcinoma of the endometrium. 31% myometrial invasion. Negative LVSI. Bilateral SLNB negative.     Screening History  Colonoscopy: 5/15/2023 normal  Mammogram: 2022 benign  Cervical cancer screenin2019 NILM/HPV negative    OB/Gynecologic History:  Deliveries:  ,  x3  Menopausal status: as above  History of HRT: No  Cervical cancer screening: See screening history above. Additional details:   Regular screening: Yes  History of abnormal: Yes many years ago but normal since.  No procedures on cervix.       Past Medical History:   Diagnosis Date      Anxiety      Arthritis 2016     Major depressive disorder, recurrent episode, mild 2016     Papanicolaou smear of cervix with atypical squamous cells of undetermined significance (ASC-US) 2011     Plantar fasciitis, bilateral     s/p surgery, b/l didnt wrk on left , has residual pain left foot adn heel chronically numb and cold at times, been checekd for DM and reports negative in the past. seen by podiatry adn was applying voltaren gel     PONV (postoperative nausea and vomiting)      Psychophysiological insomnia 2016   Undifferentiated connective tissue disorder- hydroxychloroquine, celebrex, and bupropion    Past Surgical History:   Procedure Laterality Date     BUNIONECTOMY      left foot     LAPAROSCOPIC CHOLECYSTECTOMY       LAPAROSCOPIC HYSTERECTOMY TOTAL, BILATERAL SALPINGO-OOPHORECTOMY, NODE DISSECTION, COMBINED N/A 2024    Procedure: Total Laparoscopic Hysterectomy, Bilateral Salpingo-Oophorectomy, bilateral [place] Lymph Node Dissection;  Surgeon: Hugo Sim MD;  Location: [facility] OR     RELEASE PLANTAR FASCIA      b/l        Social History     Tobacco Use     Smoking status: Former     Current packs/day: 0.00     Average packs/day: 1 pack/day for 10.0 years (10.0 ttl pk-yrs)     Types: Cigarettes     Start date: 1990     Quit date: 2000     Years since quittin.9   Substance Use Topics     Alcohol use: No     Comment: couple drinks on weekend     Drug use: No   Current living situation: Lives in [facility] with her .  She is a teacher for early childhood special education.      Family History   Problem Relation Age of Onset     Prostate Cancer Father      Asthma Daughter      Arthritis Other         other close relatives     Cancer Father         leukemia , other close relatives     Eczema Daughter      Glaucoma Other         other close relatives     Rhinitis Daughter      Hypertension Father      Migraines Mother       Osteoporosis Other         other close relatives       Allergies  Allergies   Allergen Reactions     Other [No Clinical Screening - See Comments]      Cat scan dye     Seasonal Allergies        Current Outpatient Medications   Medication Sig Dispense Refill     acetaminophen (TYLENOL) 325 MG tablet Take 2 tablets (650 mg) by mouth every 6 hours as needed for mild pain 24 tablet 0     albuterol (PROAIR HFA, PROVENTIL HFA, VENTOLIN HFA) 108 (90 BASE) MCG/ACT inhaler Inhale 2 puffs into the lungs every 6 hours as needed for shortness of breath / dyspnea or wheezing 3 Inhaler 1     buPROPion (WELLBUTRIN SR) 150 MG 12 hr tablet Take 300 mg by mouth daily       celecoxib (CELEBREX) 200 MG capsule Take 200 mg by mouth 2 times daily       cinnamon 500 MG CAPS Take 1 capsule by mouth daily       diclofenac (VOLTAREN) 1 % GEL Apply 4 grams to knees or 2 grams to hands four times daily using enclosed dosing card. 100 g 1     Flaxseed, Linseed, (FLAX SEED OIL) 1000 MG capsule        HERBALS Tumeric - 2 to 3 capsules po bid       hydroxychloroquine (PLAQUENIL) 200 MG tablet Take 200 mg by mouth 2 times daily (with meals)       ibuprofen (ADVIL/MOTRIN) 600 MG tablet Take 1 tablet (600 mg) by mouth every 6 hours as needed for other (mild and/or inflammatory pain.) 12 tablet 0     Levomefolate Glucosamine (METHYL-FOLATE PO) Place 1 Dropperette under the tongue daily       LORazepam (ATIVAN) 0.5 MG tablet Take 0.5 tablets (0.25 mg) by mouth every 8 hours as needed for anxiety (panic attack) 10 tablet 0     magnesium citrate 1.745 GM/30ML solution        MELATONIN PO 3 to 5 mg po hs prn       Multiple Vitamins-Minerals (MULTIVITAMIN ADULT PO)        ondansetron (ZOFRAN ODT) 4 MG ODT tab Take 1 tablet (4 mg) by mouth every 8 hours as needed for nausea 4 tablet 0     ondansetron (ZOFRAN) 4 MG tablet Take 4 mg by mouth every 6 hours as needed       oxyCODONE (ROXICODONE) 5 MG tablet Take 1-2 tablets (5-10 mg) by mouth every 4 hours as  "needed for moderate to severe pain 6 tablet 0     senna-docusate (SENOKOT-S/PERICOLACE) 8.6-50 MG tablet Take 1-2 tablets by mouth 2 times daily 30 tablet 0     VITAMIN D, CHOLECALCIFEROL, PO Take by mouth daily       No current facility-administered medications for this visit.       Physical Exam:   /82   Pulse 65   Resp 16   Ht 1.727 m (5' 7.99\")   Wt 93.6 kg (206 lb 6.4 oz)   SpO2 95%   BMI 31.39 kg/m    GENERAL: alert and no distress  EYES: Eyes grossly normal to inspection.  No discharge or erythema, or obvious scleral/conjunctival abnormalities.  RESP: No audible wheeze, cough, or visible cyanosis.    SKIN: Visible skin clear. No significant rash, abnormal pigmentation or lesions.  NEURO: Cranial nerves grossly intact.  Mentation and speech appropriate for age.  PSYCH: Appropriate affect, tone, and pace of words      Gastrointestinal:       Abdomen soft, non-tender, non-distended    Genitourinary: Normal atrophic external genitalia.  Normal vaginal mucosa with moderate prolapse of the vaginal walls anteriorly and posteriorly.  Vaginal cuff normal without lesions or masses.  Bimanual exam without masses or nodularity at the vaginal cuff.    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 MMRp.    Plan:   AIDEE on exam. Continue clinical surveillance every 6 months for 5 years, followed by annually thereafter.  Reviewed signs and symptoms of recurrence and when to contact clinic sooner.  Imaging will be reserved for concerning clinical findings.   -Follow-up with me in 6 months for endometrial cancer surveillance       Hugo Sim MD               Again, thank you for allowing me to participate in the care of your patient.        Sincerely,        Hugo Sim MD    Electronically signed"

## 2025-01-13 NOTE — NURSING NOTE
"Oncology Rooming Note    January 13, 2025 1:46 PM   Kyra Boogie is a 54 year old female who presents for:    Chief Complaint   Patient presents with    Oncology Clinic Visit     6 month follow up     Initial Vitals: /82   Pulse 65   Resp 16   Ht 1.727 m (5' 7.99\")   Wt 93.6 kg (206 lb 6.4 oz)   SpO2 95%   BMI 31.39 kg/m   Estimated body mass index is 31.39 kg/m  as calculated from the following:    Height as of this encounter: 1.727 m (5' 7.99\").    Weight as of this encounter: 93.6 kg (206 lb 6.4 oz). Body surface area is 2.12 meters squared.  No Pain (0) Comment: Data Unavailable   No LMP recorded. Patient is postmenopausal.  Allergies reviewed: Yes  Medications reviewed: Yes    Medications: Medication refills not needed today.  Pharmacy name entered into eTruck: [facility] [address] [place] [address]    Frailty Screening:   Is the patient here for a new oncology consult visit in cancer care? 2. No      Clinical concerns: No new concerns         Sabrina Medrano LPN              "

## 2025-01-26 ENCOUNTER — HEALTH MAINTENANCE LETTER (OUTPATIENT)
Age: 55
End: 2025-01-26

## 2025-07-30 ENCOUNTER — TELEPHONE (OUTPATIENT)
Dept: ONCOLOGY | Facility: CLINIC | Age: 55
End: 2025-07-30
Payer: COMMERCIAL

## 2025-07-30 NOTE — TELEPHONE ENCOUNTER
Left message for patient following up with cancelled visit with Dr. Sim. Provided call back details if patient would like to get rescheduled. -Sabrina JIMENEZ

## (undated) DEVICE — SOL NACL 0.9% INJ 1000ML BAG 2B1324X

## (undated) DEVICE — PREP DYNA-HEX 4% CHG SCRUB 4OZ BOTTLE MDS098710

## (undated) DEVICE — ESU ENDO SCISSORS 5MM CVD 5DCS

## (undated) DEVICE — KIT PATIENT POSITIONING PIGAZZI LATEX FREE 40580

## (undated) DEVICE — TIP CAUTERY L HOOK 36CM E377336C

## (undated) DEVICE — SYR 10ML LL W/O NDL 302995

## (undated) DEVICE — Device

## (undated) DEVICE — RETR ELEV / UTERINE MANIPULATOR V-CARE LG CUP 60-6085-202A

## (undated) DEVICE — SU VICRYL 3-0 SH 27" UND J416H

## (undated) DEVICE — DRSG TELFA 3X8" 1238

## (undated) DEVICE — TUBING SMOKE EVAC PNEUMOCLEAR HIGH FLOW 0620050250

## (undated) DEVICE — SUCTION IRR STRYKERFLOW II W/TIP 250-070-520

## (undated) DEVICE — SPECIMEN TRAP MUCOUS 40ML LUKI C30200A

## (undated) DEVICE — KOH COLPOTOMIZER OCCLUDER  CPO-6

## (undated) DEVICE — ENDO TROCAR SLEEVE KII Z-THREADED 05X100MM CTS02

## (undated) DEVICE — DRAPE SHEET MED 44X70" 9355

## (undated) DEVICE — SYR 50ML LL W/O NDL 309653

## (undated) DEVICE — ESU LIGASURE LAPAROSCOPIC BLUNT TIP SEALER 5MMX37CM LF1837

## (undated) DEVICE — NDL INSUFFLATION 13GA 120MM C2201

## (undated) DEVICE — PROTECTOR ARM ONE-STEP TRENDELENBURG 40418

## (undated) DEVICE — SU VICRYL 4-0 PS-2 18" UND J496H

## (undated) DEVICE — JELLY LUBRICATING SURGILUBE 2OZ TUBE

## (undated) DEVICE — NDL SPINAL 22GA 3.5" QUINCKE 405181

## (undated) DEVICE — GLOVE BIOGEL PI MICRO SZ 6.5 48565

## (undated) DEVICE — CATH TRAY FOLEY SURESTEP 16FR W/URNE MTR STLK LATEX A303316A

## (undated) DEVICE — SOL NACL 0.9% IRRIG 1000ML BOTTLE 2F7124

## (undated) DEVICE — DRSG PRIMAPORE 02X3" 7133

## (undated) DEVICE — ADH LIQUID MASTISOL TOPICAL VIAL 2-3ML 0523-48

## (undated) DEVICE — LINEN TOWEL PACK X6 WHITE 5487

## (undated) DEVICE — GLOVE BIOGEL PI MICRO INDICATOR UNDERGLOVE SZ 6.5 48965

## (undated) DEVICE — DRAPE MAYO STAND 23X54 8337

## (undated) DEVICE — ESU GROUND PAD ADULT W/CORD E7507

## (undated) DEVICE — APPLICATORS COTTON TIP 6"X2 STERILE LF C15053-006

## (undated) DEVICE — SUCTION MANIFOLD NEPTUNE 2 SYS 4 PORT 0702-020-000

## (undated) DEVICE — ENDO TROCAR FIRST ENTRY KII FIOS Z-THRD 05X100MM CTF03

## (undated) DEVICE — WIPES FOLEY CARE SURESTEP PROVON DFC100

## (undated) DEVICE — ESU PENCIL W/COATED BLADE E2450H

## (undated) DEVICE — BLADE CLIPPER SGL USE 9680

## (undated) DEVICE — PREP CHLORAPREP 26ML TINTED HI-LITE ORANGE 930815

## (undated) DEVICE — SU WND CLOSURE VLOC 180 ABS 0 9" GS-21 VLOCL0346

## (undated) DEVICE — SOL WATER IRRIG 1000ML BOTTLE 2F7114

## (undated) DEVICE — LINEN TOWEL PACK X30 5481

## (undated) RX ORDER — APREPITANT 40 MG/1
CAPSULE ORAL
Status: DISPENSED
Start: 2024-06-14

## (undated) RX ORDER — SCOLOPAMINE TRANSDERMAL SYSTEM 1 MG/1
PATCH, EXTENDED RELEASE TRANSDERMAL
Status: DISPENSED
Start: 2024-06-14

## (undated) RX ORDER — OXYCODONE HYDROCHLORIDE 5 MG/1
TABLET ORAL
Status: DISPENSED
Start: 2024-06-14

## (undated) RX ORDER — HEPARIN SODIUM 5000 [USP'U]/.5ML
INJECTION, SOLUTION INTRAVENOUS; SUBCUTANEOUS
Status: DISPENSED
Start: 2024-06-14

## (undated) RX ORDER — FENTANYL CITRATE 50 UG/ML
INJECTION, SOLUTION INTRAMUSCULAR; INTRAVENOUS
Status: DISPENSED
Start: 2024-06-14

## (undated) RX ORDER — METRONIDAZOLE 500 MG/100ML
INJECTION, SOLUTION INTRAVENOUS
Status: DISPENSED
Start: 2024-06-14

## (undated) RX ORDER — CEFAZOLIN SODIUM/WATER 2 G/20 ML
SYRINGE (ML) INTRAVENOUS
Status: DISPENSED
Start: 2024-06-14

## (undated) RX ORDER — ACETAMINOPHEN 325 MG/1
TABLET ORAL
Status: DISPENSED
Start: 2024-06-14